# Patient Record
Sex: MALE | Race: WHITE | NOT HISPANIC OR LATINO | Employment: OTHER | URBAN - METROPOLITAN AREA
[De-identification: names, ages, dates, MRNs, and addresses within clinical notes are randomized per-mention and may not be internally consistent; named-entity substitution may affect disease eponyms.]

---

## 2017-09-26 ENCOUNTER — APPOINTMENT (OUTPATIENT)
Dept: MRI IMAGING | Facility: HOSPITAL | Age: 72
End: 2017-09-26

## 2017-09-26 ENCOUNTER — HOSPITAL ENCOUNTER (INPATIENT)
Facility: HOSPITAL | Age: 72
LOS: 1 days | Discharge: ANOTHER HOSPITAL | End: 2017-09-26
Attending: EMERGENCY MEDICINE | Admitting: HOSPITALIST

## 2017-09-26 ENCOUNTER — HOSPITAL ENCOUNTER (INPATIENT)
Facility: HOSPITAL | Age: 72
LOS: 3 days | Discharge: HOME | End: 2017-09-29
Attending: HOSPITALIST | Admitting: HOSPITALIST

## 2017-09-26 ENCOUNTER — APPOINTMENT (OUTPATIENT)
Dept: CT IMAGING | Facility: HOSPITAL | Age: 72
End: 2017-09-26
Attending: EMERGENCY MEDICINE

## 2017-09-26 VITALS
HEIGHT: 68 IN | WEIGHT: 198.9 LBS | HEART RATE: 67 BPM | BODY MASS INDEX: 30.14 KG/M2 | SYSTOLIC BLOOD PRESSURE: 154 MMHG | OXYGEN SATURATION: 97 % | RESPIRATION RATE: 18 BRPM | TEMPERATURE: 97.6 F | DIASTOLIC BLOOD PRESSURE: 75 MMHG

## 2017-09-26 DIAGNOSIS — G45.3 AMAUROSIS FUGAX: Primary | ICD-10-CM

## 2017-09-26 DIAGNOSIS — H54.62 VISION LOSS, LEFT EYE: Primary | ICD-10-CM

## 2017-09-26 DIAGNOSIS — I65.22 STENOSIS OF LEFT CAROTID ARTERY: ICD-10-CM

## 2017-09-26 PROBLEM — I10 HYPERTENSION: Status: ACTIVE | Noted: 2017-09-26

## 2017-09-26 PROBLEM — I65.29 CAROTID STENOSIS, SYMPTOMATIC W/O INFARCT: Status: ACTIVE | Noted: 2017-09-26

## 2017-09-26 PROBLEM — M19.90 ARTHRITIS: Status: ACTIVE | Noted: 2017-09-26

## 2017-09-26 PROBLEM — J44.9 COPD (CHRONIC OBSTRUCTIVE PULMONARY DISEASE) (HCC): Status: ACTIVE | Noted: 2017-09-26

## 2017-09-26 PROBLEM — I25.10 CAD (CORONARY ARTERY DISEASE): Status: ACTIVE | Noted: 2017-09-26

## 2017-09-26 PROBLEM — E11.9 DIABETES MELLITUS (HCC): Status: ACTIVE | Noted: 2017-09-26

## 2017-09-26 LAB
ALBUMIN SERPL-MCNC: 4 G/DL (ref 3.5–5.2)
ALBUMIN/GLOB SERPL: 1.2 G/DL
ALP SERPL-CCNC: 65 U/L (ref 40–129)
ALT SERPL W P-5'-P-CCNC: 19 U/L (ref 5–41)
ANION GAP SERPL CALCULATED.3IONS-SCNC: 18.1 MMOL/L
AST SERPL-CCNC: 32 U/L (ref 5–40)
BASOPHILS # BLD AUTO: 0.07 10*3/MM3 (ref 0–0.2)
BASOPHILS NFR BLD AUTO: 1 % (ref 0–2)
BILIRUB SERPL-MCNC: 0.6 MG/DL (ref 0.2–1.2)
BUN BLD-MCNC: 15 MG/DL (ref 8–23)
BUN/CREAT SERPL: 13.5 (ref 7–25)
CALCIUM SPEC-SCNC: 9.4 MG/DL (ref 8.8–10.5)
CHLORIDE SERPL-SCNC: 100 MMOL/L (ref 98–107)
CHOLEST SERPL-MCNC: 155 MG/DL (ref 0–200)
CO2 SERPL-SCNC: 17.9 MMOL/L (ref 22–29)
CREAT BLD-MCNC: 1.11 MG/DL (ref 0.76–1.27)
DEPRECATED RDW RBC AUTO: 43.7 FL (ref 37–54)
EOSINOPHIL # BLD AUTO: 0.53 10*3/MM3 (ref 0.1–0.3)
EOSINOPHIL NFR BLD AUTO: 7.6 % (ref 0–4)
ERYTHROCYTE [DISTWIDTH] IN BLOOD BY AUTOMATED COUNT: 14 % (ref 11.5–14.5)
GFR SERPL CREATININE-BSD FRML MDRD: 65 ML/MIN/1.73
GLOBULIN UR ELPH-MCNC: 3.3 GM/DL
GLUCOSE BLD-MCNC: 144 MG/DL (ref 65–99)
GLUCOSE BLDC GLUCOMTR-MCNC: 103 MG/DL (ref 70–130)
GLUCOSE BLDC GLUCOMTR-MCNC: 105 MG/DL (ref 70–130)
GLUCOSE BLDC GLUCOMTR-MCNC: 126 MG/DL (ref 70–130)
HCT VFR BLD AUTO: 42.1 % (ref 42–52)
HDLC SERPL-MCNC: 39 MG/DL (ref 40–60)
HGB BLD-MCNC: 14.5 G/DL (ref 14–18)
IMM GRANULOCYTES # BLD: 0.03 10*3/MM3 (ref 0–0.03)
IMM GRANULOCYTES NFR BLD: 0.4 % (ref 0–0.5)
LDLC SERPL CALC-MCNC: 69 MG/DL (ref 0–100)
LDLC/HDLC SERPL: 1.77 {RATIO}
LYMPHOCYTES # BLD AUTO: 1.27 10*3/MM3 (ref 0.6–4.8)
LYMPHOCYTES NFR BLD AUTO: 18.1 % (ref 20–45)
MCH RBC QN AUTO: 29.6 PG (ref 27–31)
MCHC RBC AUTO-ENTMCNC: 34.4 G/DL (ref 31–37)
MCV RBC AUTO: 85.9 FL (ref 80–94)
MONOCYTES # BLD AUTO: 0.66 10*3/MM3 (ref 0–1)
MONOCYTES NFR BLD AUTO: 9.4 % (ref 3–8)
NEUTROPHILS # BLD AUTO: 4.45 10*3/MM3 (ref 1.5–8.3)
NEUTROPHILS NFR BLD AUTO: 63.5 % (ref 45–70)
NRBC BLD MANUAL-RTO: 0 /100 WBC (ref 0–0)
PLATELET # BLD AUTO: 242 10*3/MM3 (ref 140–500)
PMV BLD AUTO: 11 FL (ref 7.4–10.4)
POTASSIUM BLD-SCNC: 4.9 MMOL/L (ref 3.5–5.2)
PROT SERPL-MCNC: 7.3 G/DL (ref 6–8.5)
RBC # BLD AUTO: 4.9 10*6/MM3 (ref 4.7–6.1)
SODIUM BLD-SCNC: 136 MMOL/L (ref 136–145)
TRIGL SERPL-MCNC: 235 MG/DL (ref 0–150)
VLDLC SERPL-MCNC: 47 MG/DL (ref 8–32)
WBC NRBC COR # BLD: 7.01 10*3/MM3 (ref 4.8–10.8)

## 2017-09-26 PROCEDURE — 99285 EMERGENCY DEPT VISIT HI MDM: CPT | Performed by: EMERGENCY MEDICINE

## 2017-09-26 PROCEDURE — 70544 MR ANGIOGRAPHY HEAD W/O DYE: CPT

## 2017-09-26 PROCEDURE — 82962 GLUCOSE BLOOD TEST: CPT

## 2017-09-26 PROCEDURE — 99285 EMERGENCY DEPT VISIT HI MDM: CPT

## 2017-09-26 PROCEDURE — 70549 MR ANGIOGRAPH NECK W/O&W/DYE: CPT

## 2017-09-26 PROCEDURE — A9577 INJ MULTIHANCE: HCPCS | Performed by: EMERGENCY MEDICINE

## 2017-09-26 PROCEDURE — 93010 ELECTROCARDIOGRAM REPORT: CPT | Performed by: INTERNAL MEDICINE

## 2017-09-26 PROCEDURE — 80053 COMPREHEN METABOLIC PANEL: CPT | Performed by: EMERGENCY MEDICINE

## 2017-09-26 PROCEDURE — 85025 COMPLETE CBC W/AUTO DIFF WBC: CPT | Performed by: EMERGENCY MEDICINE

## 2017-09-26 PROCEDURE — 25510000001 GADOBENATE DIMEGLUMINE 529 MG/ML SOLUTION: Performed by: EMERGENCY MEDICINE

## 2017-09-26 PROCEDURE — 25010000002 ENOXAPARIN PER 10 MG: Performed by: PSYCHIATRY & NEUROLOGY

## 2017-09-26 PROCEDURE — 99235 HOSP IP/OBS SAME DATE MOD 70: CPT | Performed by: INTERNAL MEDICINE

## 2017-09-26 PROCEDURE — 93005 ELECTROCARDIOGRAM TRACING: CPT | Performed by: EMERGENCY MEDICINE

## 2017-09-26 PROCEDURE — 80061 LIPID PANEL: CPT | Performed by: PSYCHIATRY & NEUROLOGY

## 2017-09-26 PROCEDURE — 70551 MRI BRAIN STEM W/O DYE: CPT

## 2017-09-26 PROCEDURE — 70450 CT HEAD/BRAIN W/O DYE: CPT

## 2017-09-26 RX ORDER — SODIUM CHLORIDE 0.9 % (FLUSH) 0.9 %
10 SYRINGE (ML) INJECTION AS NEEDED
Status: DISCONTINUED | OUTPATIENT
Start: 2017-09-26 | End: 2017-09-29 | Stop reason: HOSPADM

## 2017-09-26 RX ORDER — MONTELUKAST SODIUM 10 MG/1
10 TABLET ORAL NIGHTLY
Status: DISCONTINUED | OUTPATIENT
Start: 2017-09-26 | End: 2017-09-26 | Stop reason: HOSPADM

## 2017-09-26 RX ORDER — DEXTROSE MONOHYDRATE 25 G/50ML
25 INJECTION, SOLUTION INTRAVENOUS
Status: DISCONTINUED | OUTPATIENT
Start: 2017-09-26 | End: 2017-09-29 | Stop reason: HOSPADM

## 2017-09-26 RX ORDER — ATORVASTATIN CALCIUM 40 MG/1
80 TABLET, FILM COATED ORAL NIGHTLY
Status: DISCONTINUED | OUTPATIENT
Start: 2017-09-26 | End: 2017-09-26 | Stop reason: HOSPADM

## 2017-09-26 RX ORDER — METOPROLOL TARTRATE 50 MG/1
50 TABLET, FILM COATED ORAL DAILY
Status: DISCONTINUED | OUTPATIENT
Start: 2017-09-27 | End: 2017-09-29 | Stop reason: HOSPADM

## 2017-09-26 RX ORDER — ASPIRIN 300 MG/1
300 SUPPOSITORY RECTAL DAILY
Status: DISCONTINUED | OUTPATIENT
Start: 2017-09-26 | End: 2017-09-26

## 2017-09-26 RX ORDER — SODIUM CHLORIDE 0.9 % (FLUSH) 0.9 %
10 SYRINGE (ML) INJECTION AS NEEDED
Status: CANCELLED | OUTPATIENT
Start: 2017-09-26

## 2017-09-26 RX ORDER — DEXTROSE MONOHYDRATE 25 G/50ML
25 INJECTION, SOLUTION INTRAVENOUS
Status: DISCONTINUED | OUTPATIENT
Start: 2017-09-26 | End: 2017-09-26 | Stop reason: HOSPADM

## 2017-09-26 RX ORDER — SODIUM CHLORIDE 9 MG/ML
40 INJECTION, SOLUTION INTRAVENOUS AS NEEDED
Status: DISCONTINUED | OUTPATIENT
Start: 2017-09-26 | End: 2017-09-29 | Stop reason: HOSPADM

## 2017-09-26 RX ORDER — CLOBETASOL PROPIONATE 0.5 MG/G
CREAM TOPICAL 2 TIMES DAILY
COMMUNITY
End: 2018-08-21 | Stop reason: ALTCHOICE

## 2017-09-26 RX ORDER — SODIUM CHLORIDE 0.9 % (FLUSH) 0.9 %
1-10 SYRINGE (ML) INJECTION AS NEEDED
Status: DISCONTINUED | OUTPATIENT
Start: 2017-09-26 | End: 2017-09-29 | Stop reason: HOSPADM

## 2017-09-26 RX ORDER — PANTOPRAZOLE SODIUM 40 MG/1
40 TABLET, DELAYED RELEASE ORAL 2 TIMES DAILY
Status: CANCELLED | OUTPATIENT
Start: 2017-09-26

## 2017-09-26 RX ORDER — GLIPIZIDE 5 MG/1
5 TABLET ORAL 2 TIMES DAILY
Status: CANCELLED | OUTPATIENT
Start: 2017-09-26

## 2017-09-26 RX ORDER — NICOTINE POLACRILEX 4 MG
15 LOZENGE BUCCAL
Status: DISCONTINUED | OUTPATIENT
Start: 2017-09-26 | End: 2017-09-26 | Stop reason: HOSPADM

## 2017-09-26 RX ORDER — NICOTINE POLACRILEX 4 MG
15 LOZENGE BUCCAL
Status: DISCONTINUED | OUTPATIENT
Start: 2017-09-26 | End: 2017-09-26 | Stop reason: SDUPTHER

## 2017-09-26 RX ORDER — GLIPIZIDE 5 MG/1
5 TABLET ORAL 2 TIMES DAILY
Status: DISCONTINUED | OUTPATIENT
Start: 2017-09-26 | End: 2017-09-26 | Stop reason: HOSPADM

## 2017-09-26 RX ORDER — CLOPIDOGREL BISULFATE 75 MG/1
75 TABLET ORAL DAILY
Status: DISCONTINUED | OUTPATIENT
Start: 2017-09-27 | End: 2017-09-26

## 2017-09-26 RX ORDER — ATORVASTATIN CALCIUM 80 MG/1
80 TABLET, FILM COATED ORAL NIGHTLY
Status: DISCONTINUED | OUTPATIENT
Start: 2017-09-26 | End: 2017-09-26

## 2017-09-26 RX ORDER — NEOMYCIN SULFATE, POLYMYXIN B SULFATE AND HYDROCORTISONE 10; 3.5; 1 MG/ML; MG/ML; [USP'U]/ML
2 SUSPENSION/ DROPS AURICULAR (OTIC) 4 TIMES DAILY
Status: ON HOLD | COMMUNITY
End: 2017-09-29

## 2017-09-26 RX ORDER — CLOPIDOGREL BISULFATE 75 MG/1
75 TABLET ORAL DAILY
Status: DISCONTINUED | OUTPATIENT
Start: 2017-09-26 | End: 2017-09-26 | Stop reason: HOSPADM

## 2017-09-26 RX ORDER — METOPROLOL TARTRATE 50 MG/1
50 TABLET, FILM COATED ORAL DAILY
Status: DISCONTINUED | OUTPATIENT
Start: 2017-09-26 | End: 2017-09-26 | Stop reason: HOSPADM

## 2017-09-26 RX ORDER — SODIUM CHLORIDE 0.9 % (FLUSH) 0.9 %
1-10 SYRINGE (ML) INJECTION AS NEEDED
Status: DISCONTINUED | OUTPATIENT
Start: 2017-09-26 | End: 2017-09-26 | Stop reason: HOSPADM

## 2017-09-26 RX ORDER — AMITRIPTYLINE HYDROCHLORIDE 10 MG/1
10 TABLET, FILM COATED ORAL NIGHTLY
Status: CANCELLED | OUTPATIENT
Start: 2017-09-26

## 2017-09-26 RX ORDER — PANTOPRAZOLE SODIUM 40 MG/1
40 TABLET, DELAYED RELEASE ORAL
Status: DISCONTINUED | OUTPATIENT
Start: 2017-09-27 | End: 2017-09-29 | Stop reason: HOSPADM

## 2017-09-26 RX ORDER — AMITRIPTYLINE HYDROCHLORIDE 10 MG/1
10 TABLET, FILM COATED ORAL NIGHTLY
Status: DISCONTINUED | OUTPATIENT
Start: 2017-09-26 | End: 2017-09-29 | Stop reason: HOSPADM

## 2017-09-26 RX ORDER — SODIUM CHLORIDE 9 MG/ML
40 INJECTION, SOLUTION INTRAVENOUS AS NEEDED
Status: CANCELLED | OUTPATIENT
Start: 2017-09-26

## 2017-09-26 RX ORDER — METOPROLOL TARTRATE 50 MG/1
50 TABLET, FILM COATED ORAL DAILY
COMMUNITY

## 2017-09-26 RX ORDER — AMITRIPTYLINE HYDROCHLORIDE 10 MG/1
10 TABLET, FILM COATED ORAL NIGHTLY
Status: DISCONTINUED | OUTPATIENT
Start: 2017-09-26 | End: 2017-09-26

## 2017-09-26 RX ORDER — MONTELUKAST SODIUM 10 MG/1
10 TABLET ORAL NIGHTLY
Status: CANCELLED | OUTPATIENT
Start: 2017-09-26

## 2017-09-26 RX ORDER — AMLODIPINE BESYLATE 5 MG/1
5 TABLET ORAL DAILY
Status: DISCONTINUED | OUTPATIENT
Start: 2017-09-26 | End: 2017-09-26 | Stop reason: HOSPADM

## 2017-09-26 RX ORDER — MONTELUKAST SODIUM 10 MG/1
10 TABLET ORAL NIGHTLY
Status: DISCONTINUED | OUTPATIENT
Start: 2017-09-26 | End: 2017-09-29 | Stop reason: HOSPADM

## 2017-09-26 RX ORDER — ASPIRIN 325 MG
325 TABLET ORAL DAILY
Status: DISCONTINUED | OUTPATIENT
Start: 2017-09-27 | End: 2017-09-27

## 2017-09-26 RX ORDER — SODIUM CHLORIDE 9 MG/ML
40 INJECTION, SOLUTION INTRAVENOUS AS NEEDED
Status: DISCONTINUED | OUTPATIENT
Start: 2017-09-26 | End: 2017-09-26 | Stop reason: HOSPADM

## 2017-09-26 RX ORDER — SODIUM CHLORIDE 0.9 % (FLUSH) 0.9 %
10 SYRINGE (ML) INJECTION AS NEEDED
Status: DISCONTINUED | OUTPATIENT
Start: 2017-09-26 | End: 2017-09-26 | Stop reason: HOSPADM

## 2017-09-26 RX ORDER — DEXTROSE MONOHYDRATE 25 G/50ML
25 INJECTION, SOLUTION INTRAVENOUS
Status: DISCONTINUED | OUTPATIENT
Start: 2017-09-26 | End: 2017-09-26 | Stop reason: SDUPTHER

## 2017-09-26 RX ORDER — ACETAMINOPHEN 325 MG/1
650 TABLET ORAL EVERY 6 HOURS PRN
Status: DISCONTINUED | OUTPATIENT
Start: 2017-09-26 | End: 2017-09-29 | Stop reason: HOSPADM

## 2017-09-26 RX ORDER — MONTELUKAST SODIUM 10 MG/1
10 TABLET ORAL NIGHTLY
COMMUNITY

## 2017-09-26 RX ORDER — AMITRIPTYLINE HYDROCHLORIDE 10 MG/1
10 TABLET, FILM COATED ORAL NIGHTLY
COMMUNITY
End: 2018-05-16

## 2017-09-26 RX ORDER — AMLODIPINE BESYLATE 5 MG/1
5 TABLET ORAL DAILY
COMMUNITY

## 2017-09-26 RX ORDER — SODIUM CHLORIDE 0.9 % (FLUSH) 0.9 %
1-10 SYRINGE (ML) INJECTION AS NEEDED
Status: CANCELLED | OUTPATIENT
Start: 2017-09-26

## 2017-09-26 RX ORDER — PANTOPRAZOLE SODIUM 40 MG/1
40 TABLET, DELAYED RELEASE ORAL 2 TIMES DAILY
Status: DISCONTINUED | OUTPATIENT
Start: 2017-09-26 | End: 2017-09-26 | Stop reason: HOSPADM

## 2017-09-26 RX ORDER — MONTELUKAST SODIUM 10 MG/1
10 TABLET ORAL NIGHTLY
Status: DISCONTINUED | OUTPATIENT
Start: 2017-09-26 | End: 2017-09-26

## 2017-09-26 RX ORDER — AMLODIPINE BESYLATE 5 MG/1
5 TABLET ORAL DAILY
Status: DISCONTINUED | OUTPATIENT
Start: 2017-09-27 | End: 2017-09-26

## 2017-09-26 RX ORDER — ONDANSETRON 2 MG/ML
4 INJECTION INTRAMUSCULAR; INTRAVENOUS EVERY 6 HOURS PRN
Status: DISCONTINUED | OUTPATIENT
Start: 2017-09-26 | End: 2017-09-29 | Stop reason: HOSPADM

## 2017-09-26 RX ORDER — PANTOPRAZOLE SODIUM 40 MG/1
40 TABLET, DELAYED RELEASE ORAL 2 TIMES DAILY
Status: DISCONTINUED | OUTPATIENT
Start: 2017-09-27 | End: 2017-09-26

## 2017-09-26 RX ORDER — METOPROLOL TARTRATE 50 MG/1
50 TABLET, FILM COATED ORAL DAILY
Status: DISCONTINUED | OUTPATIENT
Start: 2017-09-27 | End: 2017-09-26

## 2017-09-26 RX ORDER — CLOPIDOGREL BISULFATE 75 MG/1
75 TABLET ORAL DAILY
Status: ON HOLD | COMMUNITY
End: 2017-09-29

## 2017-09-26 RX ORDER — NICOTINE POLACRILEX 4 MG
15 LOZENGE BUCCAL
COMMUNITY
End: 2018-05-16 | Stop reason: ALTCHOICE

## 2017-09-26 RX ORDER — ATORVASTATIN CALCIUM 40 MG/1
80 TABLET, FILM COATED ORAL NIGHTLY
Status: CANCELLED | OUTPATIENT
Start: 2017-09-26

## 2017-09-26 RX ORDER — GLIPIZIDE 5 MG/1
5 TABLET ORAL 2 TIMES DAILY
Status: DISCONTINUED | OUTPATIENT
Start: 2017-09-26 | End: 2017-09-29

## 2017-09-26 RX ORDER — ASPIRIN 325 MG
325 TABLET ORAL DAILY
Status: DISCONTINUED | OUTPATIENT
Start: 2017-09-26 | End: 2017-09-26

## 2017-09-26 RX ORDER — METHOCARBAMOL 500 MG/1
500 TABLET, FILM COATED ORAL 3 TIMES DAILY
Status: ON HOLD | COMMUNITY
End: 2017-09-29

## 2017-09-26 RX ORDER — CLOBETASOL PROPIONATE 0.5 MG/G
1 CREAM TOPICAL 2 TIMES DAILY
Status: ON HOLD | COMMUNITY
End: 2017-09-29

## 2017-09-26 RX ORDER — METOPROLOL TARTRATE 50 MG/1
50 TABLET, FILM COATED ORAL DAILY
Status: CANCELLED | OUTPATIENT
Start: 2017-09-26

## 2017-09-26 RX ORDER — SIMVASTATIN 20 MG
20 TABLET ORAL DAILY
Status: ON HOLD | COMMUNITY
End: 2017-09-29

## 2017-09-26 RX ORDER — ASPIRIN 81 MG/1
324 TABLET, CHEWABLE ORAL ONCE
Status: COMPLETED | OUTPATIENT
Start: 2017-09-26 | End: 2017-09-26

## 2017-09-26 RX ORDER — AMLODIPINE BESYLATE 5 MG/1
5 TABLET ORAL DAILY
Status: CANCELLED | OUTPATIENT
Start: 2017-09-26

## 2017-09-26 RX ORDER — AMITRIPTYLINE HYDROCHLORIDE 10 MG/1
10 TABLET, FILM COATED ORAL NIGHTLY
Status: DISCONTINUED | OUTPATIENT
Start: 2017-09-26 | End: 2017-09-26 | Stop reason: HOSPADM

## 2017-09-26 RX ORDER — NICOTINE POLACRILEX 4 MG
15 LOZENGE BUCCAL
Status: DISCONTINUED | OUTPATIENT
Start: 2017-09-26 | End: 2017-09-29 | Stop reason: HOSPADM

## 2017-09-26 RX ORDER — ATORVASTATIN CALCIUM 80 MG/1
80 TABLET, FILM COATED ORAL NIGHTLY
Status: ON HOLD | COMMUNITY
End: 2017-09-29

## 2017-09-26 RX ORDER — GLIPIZIDE 5 MG/1
5 TABLET ORAL 2 TIMES DAILY
Status: DISCONTINUED | OUTPATIENT
Start: 2017-09-27 | End: 2017-09-26

## 2017-09-26 RX ORDER — DEXTROSE MONOHYDRATE 25 G/50ML
25 INJECTION, SOLUTION INTRAVENOUS
Status: CANCELLED | OUTPATIENT
Start: 2017-09-26

## 2017-09-26 RX ORDER — ATORVASTATIN CALCIUM 80 MG/1
80 TABLET, FILM COATED ORAL NIGHTLY
Status: DISCONTINUED | OUTPATIENT
Start: 2017-09-26 | End: 2017-09-29 | Stop reason: HOSPADM

## 2017-09-26 RX ORDER — ATORVASTATIN CALCIUM 10 MG/1
10 TABLET, FILM COATED ORAL DAILY
Status: DISCONTINUED | OUTPATIENT
Start: 2017-09-26 | End: 2017-09-26

## 2017-09-26 RX ORDER — PIROXICAM 10 MG/1
10 CAPSULE ORAL
COMMUNITY
End: 2017-09-29 | Stop reason: HOSPADM

## 2017-09-26 RX ORDER — CLOPIDOGREL BISULFATE 75 MG/1
75 TABLET ORAL DAILY
Status: CANCELLED | OUTPATIENT
Start: 2017-09-27

## 2017-09-26 RX ORDER — NICOTINE POLACRILEX 4 MG
15 LOZENGE BUCCAL
Status: CANCELLED | OUTPATIENT
Start: 2017-09-26

## 2017-09-26 RX ADMIN — ENOXAPARIN SODIUM 40 MG: 40 INJECTION SUBCUTANEOUS at 12:54

## 2017-09-26 RX ADMIN — GLIPIZIDE 5 MG: 5 TABLET ORAL at 22:14

## 2017-09-26 RX ADMIN — ASPIRIN 81 MG 324 MG: 81 TABLET ORAL at 11:08

## 2017-09-26 RX ADMIN — ATORVASTATIN CALCIUM 80 MG: 80 TABLET, FILM COATED ORAL at 22:14

## 2017-09-26 RX ADMIN — CLOPIDOGREL BISULFATE 75 MG: 75 TABLET ORAL at 13:05

## 2017-09-26 RX ADMIN — GADOBENATE DIMEGLUMINE 18 ML: 529 INJECTION, SOLUTION INTRAVENOUS at 14:47

## 2017-09-26 RX ADMIN — METFORMIN HYDROCHLORIDE 1000 MG: 1000 TABLET ORAL at 22:13

## 2017-09-26 RX ADMIN — AMITRIPTYLINE HYDROCHLORIDE 10 MG: 10 TABLET, FILM COATED ORAL at 22:14

## 2017-09-26 RX ADMIN — MONTELUKAST 10 MG: 10 TABLET, FILM COATED ORAL at 22:14

## 2017-09-27 ENCOUNTER — ANESTHESIA EVENT (OUTPATIENT)
Dept: PERIOP | Facility: HOSPITAL | Age: 72
End: 2017-09-27

## 2017-09-27 ENCOUNTER — ANESTHESIA (OUTPATIENT)
Dept: PERIOP | Facility: HOSPITAL | Age: 72
End: 2017-09-27

## 2017-09-27 ENCOUNTER — APPOINTMENT (OUTPATIENT)
Dept: NUCLEAR MEDICINE | Facility: HOSPITAL | Age: 72
End: 2017-09-27
Attending: SURGERY

## 2017-09-27 ENCOUNTER — APPOINTMENT (OUTPATIENT)
Dept: CARDIOLOGY | Facility: HOSPITAL | Age: 72
End: 2017-09-27
Attending: SURGERY

## 2017-09-27 LAB
ARTICHOKE IGE QN: 80 MG/DL (ref 0–100)
BH CV STRESS BP STAGE 1: NORMAL
BH CV STRESS BP STAGE 2: NORMAL
BH CV STRESS DURATION MIN STAGE 1: 3
BH CV STRESS DURATION MIN STAGE 2: 2
BH CV STRESS DURATION SEC STAGE 1: 0
BH CV STRESS DURATION SEC STAGE 2: 3
BH CV STRESS GRADE STAGE 1: 10
BH CV STRESS GRADE STAGE 2: 12
BH CV STRESS HR STAGE 1: 137
BH CV STRESS HR STAGE 2: 152
BH CV STRESS METS STAGE 1: 5
BH CV STRESS METS STAGE 2: 7.5
BH CV STRESS PROTOCOL 1: NORMAL
BH CV STRESS RECOVERY BP: NORMAL MMHG
BH CV STRESS RECOVERY HR: 110 BPM
BH CV STRESS SPEED STAGE 1: 1.7
BH CV STRESS SPEED STAGE 2: 2.5
BH CV STRESS STAGE 1: 1
BH CV STRESS STAGE 2: 2
BH CV XLRA MEAS LEFT BULB PSV: 415 CM/SEC
BH CV XLRA MEAS LEFT DIST CCA EDV: -11.7 CM/SEC
BH CV XLRA MEAS LEFT DIST CCA PSV: -85 CM/SEC
BH CV XLRA MEAS LEFT DIST ICA EDV: -12 CM/SEC
BH CV XLRA MEAS LEFT DIST ICA PSV: -29.4 CM/SEC
BH CV XLRA MEAS LEFT ICA/CCA RATIO: 5
BH CV XLRA MEAS LEFT MID ICA EDV: -14 CM/SEC
BH CV XLRA MEAS LEFT MID ICA PSV: -27.9 CM/SEC
BH CV XLRA MEAS LEFT PROX CCA EDV: 10.6 CM/SEC
BH CV XLRA MEAS LEFT PROX CCA PSV: 80.3 CM/SEC
BH CV XLRA MEAS LEFT PROX ECA EDV: -10.2 CM/SEC
BH CV XLRA MEAS LEFT PROX ECA PSV: -123 CM/SEC
BH CV XLRA MEAS LEFT PROX ICA EDV: 170 CM/SEC
BH CV XLRA MEAS LEFT PROX ICA PSV: 423 CM/SEC
BH CV XLRA MEAS LEFT PROX SCLA PSV: 151 CM/SEC
BH CV XLRA MEAS LEFT VERTEBRAL A EDV: 17 CM/SEC
BH CV XLRA MEAS LEFT VERTEBRAL A PSV: 55.1 CM/SEC
BH CV XLRA MEAS RIGHT DIST CCA EDV: 14.9 CM/SEC
BH CV XLRA MEAS RIGHT DIST CCA PSV: 91.1 CM/SEC
BH CV XLRA MEAS RIGHT DIST ICA EDV: -21.2 CM/SEC
BH CV XLRA MEAS RIGHT DIST ICA PSV: -76.2 CM/SEC
BH CV XLRA MEAS RIGHT ICA/CCA RATIO: 0.84
BH CV XLRA MEAS RIGHT MID ICA EDV: -19.6 CM/SEC
BH CV XLRA MEAS RIGHT MID ICA PSV: -65.2 CM/SEC
BH CV XLRA MEAS RIGHT PROX CCA EDV: 21.2 CM/SEC
BH CV XLRA MEAS RIGHT PROX CCA PSV: 97.4 CM/SEC
BH CV XLRA MEAS RIGHT PROX ECA EDV: -9.4 CM/SEC
BH CV XLRA MEAS RIGHT PROX ECA PSV: -102 CM/SEC
BH CV XLRA MEAS RIGHT PROX ICA EDV: -18.9 CM/SEC
BH CV XLRA MEAS RIGHT PROX ICA PSV: -66.8 CM/SEC
BH CV XLRA MEAS RIGHT PROX SCLA PSV: 87.4 CM/SEC
BH CV XLRA MEAS RIGHT VERTEBRAL A EDV: -13.4 CM/SEC
BH CV XLRA MEAS RIGHT VERTEBRAL A PSV: -59.7 CM/SEC
CHOLEST SERPL-MCNC: 158 MG/DL (ref 0–200)
GLUCOSE BLDC GLUCOMTR-MCNC: 118 MG/DL (ref 70–130)
GLUCOSE BLDC GLUCOMTR-MCNC: 135 MG/DL (ref 70–130)
GLUCOSE BLDC GLUCOMTR-MCNC: 147 MG/DL (ref 70–130)
GLUCOSE BLDC GLUCOMTR-MCNC: 193 MG/DL (ref 70–130)
HBA1C MFR BLD: 8.04 % (ref 4.8–5.6)
HDLC SERPL-MCNC: 32 MG/DL (ref 40–60)
LDLC SERPL CALC-MCNC: ABNORMAL MG/DL (ref 0–100)
LDLC/HDLC SERPL: ABNORMAL {RATIO}
LEFT ARM BP: NORMAL MMHG
LV EF NUC BP: 64 %
MAXIMAL PREDICTED HEART RATE: 148 BPM
PERCENT MAX PREDICTED HR: 102.7 %
RIGHT ARM BP: NORMAL MMHG
STRESS BASELINE BP: NORMAL MMHG
STRESS BASELINE HR: 96 BPM
STRESS PERCENT HR: 121 %
STRESS POST ESTIMATED WORKLOAD: 6.4 METS
STRESS POST EXERCISE DUR MIN: 5 MIN
STRESS POST EXERCISE DUR SEC: 3 SEC
STRESS POST PEAK BP: NORMAL MMHG
STRESS POST PEAK HR: 152 BPM
STRESS TARGET HR: 126 BPM
TRIGL SERPL-MCNC: 491 MG/DL (ref 0–150)
VLDLC SERPL-MCNC: ABNORMAL MG/DL (ref 5–40)

## 2017-09-27 PROCEDURE — 83036 HEMOGLOBIN GLYCOSYLATED A1C: CPT | Performed by: HOSPITALIST

## 2017-09-27 PROCEDURE — 25010000002 FENTANYL CITRATE (PF) 100 MCG/2ML SOLUTION: Performed by: ANESTHESIOLOGY

## 2017-09-27 PROCEDURE — 82962 GLUCOSE BLOOD TEST: CPT

## 2017-09-27 PROCEDURE — 94799 UNLISTED PULMONARY SVC/PX: CPT

## 2017-09-27 PROCEDURE — 93880 EXTRACRANIAL BILAT STUDY: CPT

## 2017-09-27 PROCEDURE — 80061 LIPID PANEL: CPT | Performed by: HOSPITALIST

## 2017-09-27 PROCEDURE — 93017 CV STRESS TEST TRACING ONLY: CPT

## 2017-09-27 PROCEDURE — 78452 HT MUSCLE IMAGE SPECT MULT: CPT

## 2017-09-27 PROCEDURE — 85347 COAGULATION TIME ACTIVATED: CPT

## 2017-09-27 PROCEDURE — 83721 ASSAY OF BLOOD LIPOPROTEIN: CPT | Performed by: HOSPITALIST

## 2017-09-27 PROCEDURE — 25010000002 PROPOFOL 10 MG/ML EMULSION: Performed by: ANESTHESIOLOGY

## 2017-09-27 PROCEDURE — A9500 TC99M SESTAMIBI: HCPCS | Performed by: INTERNAL MEDICINE

## 2017-09-27 PROCEDURE — 63710000001 INSULIN ASPART PER 5 UNITS: Performed by: HOSPITALIST

## 2017-09-27 PROCEDURE — 0BH17EZ INSERTION OF ENDOTRACHEAL AIRWAY INTO TRACHEA, VIA NATURAL OR ARTIFICIAL OPENING: ICD-10-PCS | Performed by: ANESTHESIOLOGY

## 2017-09-27 PROCEDURE — 82947 ASSAY GLUCOSE BLOOD QUANT: CPT

## 2017-09-27 PROCEDURE — 85014 HEMATOCRIT: CPT

## 2017-09-27 PROCEDURE — 0 TECHNETIUM SESTAMIBI: Performed by: INTERNAL MEDICINE

## 2017-09-27 PROCEDURE — 82803 BLOOD GASES ANY COMBINATION: CPT

## 2017-09-27 PROCEDURE — 85018 HEMOGLOBIN: CPT

## 2017-09-27 PROCEDURE — 25010000003 CEFAZOLIN IN DEXTROSE 2-4 GM/100ML-% SOLUTION: Performed by: NEUROLOGICAL SURGERY

## 2017-09-27 PROCEDURE — 25010000002 PHENYLEPHRINE PER 1 ML: Performed by: ANESTHESIOLOGY

## 2017-09-27 PROCEDURE — 99223 1ST HOSP IP/OBS HIGH 75: CPT | Performed by: NURSE PRACTITIONER

## 2017-09-27 PROCEDURE — 94640 AIRWAY INHALATION TREATMENT: CPT

## 2017-09-27 PROCEDURE — 25010000002 MIDAZOLAM PER 1 MG: Performed by: ANESTHESIOLOGY

## 2017-09-27 RX ORDER — IPRATROPIUM BROMIDE AND ALBUTEROL SULFATE 2.5; .5 MG/3ML; MG/3ML
3 SOLUTION RESPIRATORY (INHALATION) ONCE
Status: COMPLETED | OUTPATIENT
Start: 2017-09-27 | End: 2017-09-27

## 2017-09-27 RX ORDER — PROMETHAZINE HYDROCHLORIDE 25 MG/1
12.5 TABLET ORAL ONCE AS NEEDED
Status: DISCONTINUED | OUTPATIENT
Start: 2017-09-27 | End: 2017-09-27 | Stop reason: HOSPADM

## 2017-09-27 RX ORDER — LIDOCAINE HYDROCHLORIDE 40 MG/ML
SOLUTION TOPICAL AS NEEDED
Status: DISCONTINUED | OUTPATIENT
Start: 2017-09-27 | End: 2017-09-27 | Stop reason: SURG

## 2017-09-27 RX ORDER — FENTANYL CITRATE 50 UG/ML
INJECTION, SOLUTION INTRAMUSCULAR; INTRAVENOUS AS NEEDED
Status: DISCONTINUED | OUTPATIENT
Start: 2017-09-27 | End: 2017-09-27 | Stop reason: SURG

## 2017-09-27 RX ORDER — ROCURONIUM BROMIDE 10 MG/ML
INJECTION, SOLUTION INTRAVENOUS AS NEEDED
Status: DISCONTINUED | OUTPATIENT
Start: 2017-09-27 | End: 2017-09-27 | Stop reason: SURG

## 2017-09-27 RX ORDER — PROMETHAZINE HYDROCHLORIDE 25 MG/1
25 SUPPOSITORY RECTAL ONCE AS NEEDED
Status: DISCONTINUED | OUTPATIENT
Start: 2017-09-27 | End: 2017-09-27 | Stop reason: HOSPADM

## 2017-09-27 RX ORDER — HYDRALAZINE HYDROCHLORIDE 20 MG/ML
5 INJECTION INTRAMUSCULAR; INTRAVENOUS
Status: DISCONTINUED | OUTPATIENT
Start: 2017-09-27 | End: 2017-09-27 | Stop reason: HOSPADM

## 2017-09-27 RX ORDER — FENTANYL CITRATE 50 UG/ML
50 INJECTION, SOLUTION INTRAMUSCULAR; INTRAVENOUS
Status: DISCONTINUED | OUTPATIENT
Start: 2017-09-27 | End: 2017-09-27 | Stop reason: HOSPADM

## 2017-09-27 RX ORDER — PROPOFOL 10 MG/ML
VIAL (ML) INTRAVENOUS AS NEEDED
Status: DISCONTINUED | OUTPATIENT
Start: 2017-09-27 | End: 2017-09-27 | Stop reason: SURG

## 2017-09-27 RX ORDER — CEFAZOLIN SODIUM 2 G/100ML
2 INJECTION, SOLUTION INTRAVENOUS ONCE
Status: COMPLETED | OUTPATIENT
Start: 2017-09-27 | End: 2017-09-27

## 2017-09-27 RX ORDER — LIDOCAINE HYDROCHLORIDE 20 MG/ML
INJECTION, SOLUTION INFILTRATION; PERINEURAL AS NEEDED
Status: DISCONTINUED | OUTPATIENT
Start: 2017-09-27 | End: 2017-09-27 | Stop reason: SURG

## 2017-09-27 RX ORDER — PROMETHAZINE HYDROCHLORIDE 25 MG/ML
12.5 INJECTION, SOLUTION INTRAMUSCULAR; INTRAVENOUS ONCE AS NEEDED
Status: DISCONTINUED | OUTPATIENT
Start: 2017-09-27 | End: 2017-09-27 | Stop reason: HOSPADM

## 2017-09-27 RX ORDER — OXYCODONE AND ACETAMINOPHEN 7.5; 325 MG/1; MG/1
1 TABLET ORAL ONCE AS NEEDED
Status: DISCONTINUED | OUTPATIENT
Start: 2017-09-27 | End: 2017-09-27 | Stop reason: HOSPADM

## 2017-09-27 RX ORDER — CLOPIDOGREL BISULFATE 75 MG/1
75 TABLET ORAL DAILY
Status: DISCONTINUED | OUTPATIENT
Start: 2017-09-28 | End: 2017-09-27

## 2017-09-27 RX ORDER — PROMETHAZINE HYDROCHLORIDE 25 MG/1
25 TABLET ORAL ONCE AS NEEDED
Status: DISCONTINUED | OUTPATIENT
Start: 2017-09-27 | End: 2017-09-27 | Stop reason: HOSPADM

## 2017-09-27 RX ORDER — HYDROMORPHONE HYDROCHLORIDE 1 MG/ML
0.5 INJECTION, SOLUTION INTRAMUSCULAR; INTRAVENOUS; SUBCUTANEOUS
Status: DISCONTINUED | OUTPATIENT
Start: 2017-09-27 | End: 2017-09-27 | Stop reason: HOSPADM

## 2017-09-27 RX ORDER — NITROGLYCERIN 5 MG/ML
INJECTION, SOLUTION INTRAVENOUS AS NEEDED
Status: DISCONTINUED | OUTPATIENT
Start: 2017-09-27 | End: 2017-09-27 | Stop reason: SURG

## 2017-09-27 RX ORDER — LABETALOL HYDROCHLORIDE 5 MG/ML
5 INJECTION, SOLUTION INTRAVENOUS
Status: DISCONTINUED | OUTPATIENT
Start: 2017-09-27 | End: 2017-09-27 | Stop reason: HOSPADM

## 2017-09-27 RX ORDER — SODIUM CHLORIDE, SODIUM LACTATE, POTASSIUM CHLORIDE, CALCIUM CHLORIDE 600; 310; 30; 20 MG/100ML; MG/100ML; MG/100ML; MG/100ML
9 INJECTION, SOLUTION INTRAVENOUS CONTINUOUS
Status: DISCONTINUED | OUTPATIENT
Start: 2017-09-27 | End: 2017-09-29 | Stop reason: HOSPADM

## 2017-09-27 RX ORDER — CLOPIDOGREL BISULFATE 75 MG/1
150 TABLET ORAL ONCE
Status: DISCONTINUED | OUTPATIENT
Start: 2017-09-27 | End: 2017-09-27

## 2017-09-27 RX ORDER — MIDAZOLAM HYDROCHLORIDE 1 MG/ML
2 INJECTION INTRAMUSCULAR; INTRAVENOUS
Status: DISCONTINUED | OUTPATIENT
Start: 2017-09-27 | End: 2017-09-27 | Stop reason: HOSPADM

## 2017-09-27 RX ORDER — FLUMAZENIL 0.1 MG/ML
0.2 INJECTION INTRAVENOUS AS NEEDED
Status: DISCONTINUED | OUTPATIENT
Start: 2017-09-27 | End: 2017-09-27 | Stop reason: HOSPADM

## 2017-09-27 RX ORDER — FAMOTIDINE 10 MG/ML
20 INJECTION, SOLUTION INTRAVENOUS ONCE
Status: COMPLETED | OUTPATIENT
Start: 2017-09-27 | End: 2017-09-27

## 2017-09-27 RX ORDER — DIPHENHYDRAMINE HYDROCHLORIDE 50 MG/ML
12.5 INJECTION INTRAMUSCULAR; INTRAVENOUS
Status: DISCONTINUED | OUTPATIENT
Start: 2017-09-27 | End: 2017-09-27 | Stop reason: HOSPADM

## 2017-09-27 RX ORDER — SODIUM CHLORIDE 0.9 % (FLUSH) 0.9 %
1-10 SYRINGE (ML) INJECTION AS NEEDED
Status: DISCONTINUED | OUTPATIENT
Start: 2017-09-27 | End: 2017-09-27 | Stop reason: HOSPADM

## 2017-09-27 RX ORDER — SODIUM CHLORIDE, SODIUM LACTATE, POTASSIUM CHLORIDE, CALCIUM CHLORIDE 600; 310; 30; 20 MG/100ML; MG/100ML; MG/100ML; MG/100ML
100 INJECTION, SOLUTION INTRAVENOUS CONTINUOUS
Status: DISCONTINUED | OUTPATIENT
Start: 2017-09-27 | End: 2017-09-29 | Stop reason: HOSPADM

## 2017-09-27 RX ORDER — HYDROCODONE BITARTRATE AND ACETAMINOPHEN 7.5; 325 MG/1; MG/1
1 TABLET ORAL ONCE AS NEEDED
Status: DISCONTINUED | OUTPATIENT
Start: 2017-09-27 | End: 2017-09-27 | Stop reason: HOSPADM

## 2017-09-27 RX ORDER — MIDAZOLAM HYDROCHLORIDE 1 MG/ML
1 INJECTION INTRAMUSCULAR; INTRAVENOUS
Status: DISCONTINUED | OUTPATIENT
Start: 2017-09-27 | End: 2017-09-27 | Stop reason: HOSPADM

## 2017-09-27 RX ORDER — EPHEDRINE SULFATE 50 MG/ML
5 INJECTION, SOLUTION INTRAVENOUS ONCE AS NEEDED
Status: DISCONTINUED | OUTPATIENT
Start: 2017-09-27 | End: 2017-09-27 | Stop reason: HOSPADM

## 2017-09-27 RX ORDER — NALOXONE HCL 0.4 MG/ML
0.2 VIAL (ML) INJECTION AS NEEDED
Status: DISCONTINUED | OUTPATIENT
Start: 2017-09-27 | End: 2017-09-27 | Stop reason: HOSPADM

## 2017-09-27 RX ORDER — ONDANSETRON 2 MG/ML
4 INJECTION INTRAMUSCULAR; INTRAVENOUS ONCE AS NEEDED
Status: DISCONTINUED | OUTPATIENT
Start: 2017-09-27 | End: 2017-09-27 | Stop reason: HOSPADM

## 2017-09-27 RX ADMIN — CEFAZOLIN SODIUM 2 G: 2 INJECTION, SOLUTION INTRAVENOUS at 17:46

## 2017-09-27 RX ADMIN — PHENYLEPHRINE HYDROCHLORIDE 100 MCG: 10 INJECTION INTRAVENOUS at 17:38

## 2017-09-27 RX ADMIN — SUGAMMADEX 500 MG: 100 INJECTION, SOLUTION INTRAVENOUS at 18:16

## 2017-09-27 RX ADMIN — FENTANYL CITRATE 50 MCG: 50 INJECTION INTRAMUSCULAR; INTRAVENOUS at 17:37

## 2017-09-27 RX ADMIN — PROPOFOL 125 MG: 10 INJECTION, EMULSION INTRAVENOUS at 17:37

## 2017-09-27 RX ADMIN — AMITRIPTYLINE HYDROCHLORIDE 10 MG: 10 TABLET, FILM COATED ORAL at 21:07

## 2017-09-27 RX ADMIN — IPRATROPIUM BROMIDE AND ALBUTEROL SULFATE 3 ML: .5; 3 SOLUTION RESPIRATORY (INHALATION) at 17:10

## 2017-09-27 RX ADMIN — LIDOCAINE HYDROCHLORIDE 80 MG: 20 INJECTION, SOLUTION INFILTRATION; PERINEURAL at 17:37

## 2017-09-27 RX ADMIN — MIDAZOLAM 1 MG: 1 INJECTION INTRAMUSCULAR; INTRAVENOUS at 16:48

## 2017-09-27 RX ADMIN — TECHNETIUM TC-99M SESTAMIBI 1 DOSE: 1 INJECTION INTRAVENOUS at 12:05

## 2017-09-27 RX ADMIN — INSULIN ASPART 2 UNITS: 100 INJECTION, SOLUTION INTRAVENOUS; SUBCUTANEOUS at 21:18

## 2017-09-27 RX ADMIN — FENTANYL CITRATE 50 MCG: 50 INJECTION INTRAMUSCULAR; INTRAVENOUS at 16:52

## 2017-09-27 RX ADMIN — SODIUM CHLORIDE, POTASSIUM CHLORIDE, SODIUM LACTATE AND CALCIUM CHLORIDE 9 ML/HR: 600; 310; 30; 20 INJECTION, SOLUTION INTRAVENOUS at 16:44

## 2017-09-27 RX ADMIN — MONTELUKAST 10 MG: 10 TABLET, FILM COATED ORAL at 21:07

## 2017-09-27 RX ADMIN — PANTOPRAZOLE SODIUM 40 MG: 40 TABLET, DELAYED RELEASE ORAL at 05:04

## 2017-09-27 RX ADMIN — ATORVASTATIN CALCIUM 80 MG: 80 TABLET, FILM COATED ORAL at 21:07

## 2017-09-27 RX ADMIN — TECHNETIUM TC-99M SESTAMIBI 1 DOSE: 1 INJECTION INTRAVENOUS at 13:45

## 2017-09-27 RX ADMIN — LIDOCAINE HYDROCHLORIDE 1 EACH: 40 SPRAY LARYNGEAL; TRANSTRACHEAL at 17:39

## 2017-09-27 RX ADMIN — FENTANYL CITRATE 50 MCG: 50 INJECTION INTRAMUSCULAR; INTRAVENOUS at 18:18

## 2017-09-27 RX ADMIN — PHENYLEPHRINE HYDROCHLORIDE 0.5 MCG/KG/MIN: 10 INJECTION, SOLUTION INTRAMUSCULAR; INTRAVENOUS; SUBCUTANEOUS at 17:46

## 2017-09-27 RX ADMIN — NITROGLYCERIN 100 MCG: 5 INJECTION, SOLUTION INTRAVENOUS at 18:21

## 2017-09-27 RX ADMIN — ROCURONIUM BROMIDE 50 MG: 10 INJECTION INTRAVENOUS at 17:37

## 2017-09-27 RX ADMIN — NITROGLYCERIN 100 MCG: 5 INJECTION, SOLUTION INTRAVENOUS at 18:18

## 2017-09-27 RX ADMIN — PHENYLEPHRINE HYDROCHLORIDE 100 MCG: 10 INJECTION INTRAVENOUS at 17:44

## 2017-09-27 RX ADMIN — FAMOTIDINE 20 MG: 10 INJECTION, SOLUTION INTRAVENOUS at 16:44

## 2017-09-27 NOTE — ANESTHESIA PROCEDURE NOTES
Rt Radial Arterial Line    Patient location during procedure: holding area   Line placed for hemodynamic monitoring and ABGs/Labs/ISTAT.  Performed By   Anesthesiologist: ANNY CARR  Preanesthetic Checklist  Completed: patient identified, site marked, surgical consent, pre-op evaluation, timeout performed, IV checked, risks and benefits discussed and monitors and equipment checked  Arterial Line Prep   Sterile Tech: gloves, mask, cap and sterile barriers  Prep: ChloraPrep  Patient monitoring: blood pressure monitoring, continuous pulse oximetry and EKG  Arterial Line Procedure   Laterality:right  Location:  radial artery  Catheter size: 20 G   Guidance: ultrasound guided  PROCEDURE NOTE/ULTRASOUND INTERPRETATION.  Using ultrasound guidance the potential vascular sites for insertion of the catheter were visualized to determine the patency of the vessel to be used for vascular access.  After selecting the appropriate site for insertion, the needle was visualized under ultrasound being inserted into the radial artery, followed by ultrasound confirmation of wire and catheter placement. There were no abnormalities seen on ultrasound; an image was taken; and the patient tolerated the procedure with no complications.   Number of attempts: 1  Successful placement: yes          Post Assessment   Dressing Type: occlusive dressing applied, secured with tape and wrist guard applied.   Complications no  Circ/Move/Sens Assessment: normal and unchanged.   Patient Tolerance: patient tolerated the procedure well with no apparent complications

## 2017-09-27 NOTE — ANESTHESIA PREPROCEDURE EVALUATION
Anesthesia Evaluation     Patient summary reviewed and Nursing notes reviewed   no history of anesthetic complications:  NPO Solid Status: Waived due to emergency  NPO Liquid Status: Waived due to emergency     Airway   Mallampati: II  TM distance: >3 FB  Neck ROM: full  no difficulty expected  Dental - normal exam   (+) edentulous    Pulmonary     breath sounds clear to auscultation  (+) a smoker Former, COPD severe, asthma, wheezes,   (-) shortness of breath, sleep apnea, decreased breath sounds  Cardiovascular - normal exam  Exercise tolerance: good (4-7 METS)    ECG reviewed  PT is on anticoagulation therapy  Patient on routine beta blocker and Beta blocker given within 24 hours of surgery  Rhythm: regular  Rate: normal    (+) hypertension poorly controlled, CAD, cardiac stents unknown timeframe PVD, hyperlipidemia  (-) past MI, angina, CHF, orthopnea, PND, COTE    ROS comment: Recent normal stress test, however his Jerad's Revised Cardiac Risk Index places him at over 11% risk for a major cardiac event    Neuro/Psych  (+) TIA (recent Amarousis Fugax to left eye),    (-) seizures, neuromuscular disease, CVA, dizziness/light headedness, weakness, numbness    ROS Comment: Carotid disease:  1. Critical stenosis of the proximal left internal carotid artery  estimated at greater than 90%.  2. Mild atherosclerotic disease of the right internal carotid artery,  however no evidence of a significant stenosis.  GI/Hepatic/Renal/Endo    (+)  diabetes mellitus using insulin,   (-) liver disease    Musculoskeletal     Abdominal  - normal exam   Substance History - negative use  (-) alcohol use, drug use     OB/GYN negative ob/gyn ROS         Other   (+) arthritis                                   Anesthesia Plan    ASA 4 - emergent     general   (A-line)  intravenous induction   Anesthetic plan and risks discussed with patient.  Use of blood products discussed with patient  Consented to blood products.

## 2017-09-27 NOTE — ANESTHESIA PROCEDURE NOTES
Airway  Urgency: elective    Airway not difficult    General Information and Staff    Patient location during procedure: OR  Anesthesiologist: ANNY CARR    Indications and Patient Condition  Indications for airway management: airway protection    Preoxygenated: yes  Mask difficulty assessment: 1 - vent by mask    Final Airway Details  Final airway type: endotracheal airway      Successful airway: ETT  Cuffed: yes   Successful intubation technique: direct laryngoscopy  Endotracheal tube insertion site: oral  Blade: Nahomy  Blade size: #4  ETT size: 8.0 mm  Cormack-Lehane Classification: grade I - full view of glottis  Placement verified by: chest auscultation and capnometry   Measured from: gums  ETT to gums (cm): 21  Number of attempts at approach: 1

## 2017-09-27 NOTE — ANESTHESIA POSTPROCEDURE EVALUATION
"Patient: Ted Glover    Procedure Summary     Date Anesthesia Start Anesthesia Stop Room / Location    09/27/17 1729 1827  [facility] / [facility]       Procedure Diagnosis Surgeon Provider    left carotid endarterectomy (Left Neck) Amaurosis fugax; Stenosis of left carotid artery  (Amaurosis fugax [G45.3]; Stenosis of left carotid artery [I65.22]) MD iZon Walden MD          Anesthesia Type: general  Last vitals  BP   126/78 (09/27/17 1905)    Temp   36.5 °C (97.7 °F) (09/27/17 1828)    Pulse   80 (09/27/17 1905)   Resp   14 (09/27/17 1905)    SpO2   93 % (09/27/17 1905)      Post Anesthesia Care and Evaluation    Patient location during evaluation: PACU  Patient participation: complete - patient participated  Level of consciousness: sleepy but conscious  Pain score: 0  Pain management: adequate  Airway patency: patent  Anesthetic complications: No anesthetic complications    Cardiovascular status: acceptable  Respiratory status: acceptable  Hydration status: acceptable    Comments: /78  Pulse 80  Temp 36.5 °C (97.7 °F) (Oral)   Resp 14  Ht 68\" (172.7 cm)  Wt 200 lb 6.4 oz (90.9 kg)  SpO2 93%  BMI 30.47 kg/m2        "

## 2017-09-28 ENCOUNTER — ANESTHESIA EVENT (OUTPATIENT)
Dept: PERIOP | Facility: HOSPITAL | Age: 72
End: 2017-09-28

## 2017-09-28 ENCOUNTER — ANESTHESIA (OUTPATIENT)
Dept: PERIOP | Facility: HOSPITAL | Age: 72
End: 2017-09-28

## 2017-09-28 ENCOUNTER — APPOINTMENT (OUTPATIENT)
Dept: CARDIOLOGY | Facility: HOSPITAL | Age: 72
End: 2017-09-28
Attending: NEUROLOGICAL SURGERY

## 2017-09-28 LAB
ACT BLD: 125 SECONDS (ref 82–152)
ANION GAP SERPL CALCULATED.3IONS-SCNC: 14.9 MMOL/L
BASE EXCESS BLDA CALC-SCNC: -1 MMOL/L (ref -5–5)
BASOPHILS # BLD AUTO: 0.07 10*3/MM3 (ref 0–0.2)
BASOPHILS NFR BLD AUTO: 1.1 % (ref 0–1.5)
BUN BLD-MCNC: 14 MG/DL (ref 8–23)
BUN/CREAT SERPL: 12.7 (ref 7–25)
CALCIUM SPEC-SCNC: 9.1 MG/DL (ref 8.6–10.5)
CHLORIDE SERPL-SCNC: 104 MMOL/L (ref 98–107)
CO2 BLDA-SCNC: 27 MMOL/L (ref 24–29)
CO2 SERPL-SCNC: 22.1 MMOL/L (ref 22–29)
CREAT BLD-MCNC: 1.1 MG/DL (ref 0.76–1.27)
DEPRECATED RDW RBC AUTO: 47 FL (ref 37–54)
EOSINOPHIL # BLD AUTO: 0.46 10*3/MM3 (ref 0–0.7)
EOSINOPHIL NFR BLD AUTO: 7.2 % (ref 0.3–6.2)
ERYTHROCYTE [DISTWIDTH] IN BLOOD BY AUTOMATED COUNT: 14.3 % (ref 11.5–14.5)
GFR SERPL CREATININE-BSD FRML MDRD: 66 ML/MIN/1.73
GLUCOSE BLD-MCNC: 180 MG/DL (ref 65–99)
GLUCOSE BLDC GLUCOMTR-MCNC: 141 MG/DL (ref 70–130)
GLUCOSE BLDC GLUCOMTR-MCNC: 157 MG/DL (ref 70–130)
GLUCOSE BLDC GLUCOMTR-MCNC: 203 MG/DL (ref 70–130)
HCO3 BLDA-SCNC: 25.3 MMOL/L (ref 22–26)
HCT VFR BLD AUTO: 41.3 % (ref 40.4–52.2)
HCT VFR BLDA CALC: 41 % (ref 38–51)
HGB BLD-MCNC: 13.7 G/DL (ref 13.7–17.6)
HGB BLDA-MCNC: 13.9 G/DL (ref 12–17)
IMM GRANULOCYTES # BLD: 0.02 10*3/MM3 (ref 0–0.03)
IMM GRANULOCYTES NFR BLD: 0.3 % (ref 0–0.5)
LYMPHOCYTES # BLD AUTO: 1.35 10*3/MM3 (ref 0.9–4.8)
LYMPHOCYTES NFR BLD AUTO: 21 % (ref 19.6–45.3)
MCH RBC QN AUTO: 30.1 PG (ref 27–32.7)
MCHC RBC AUTO-ENTMCNC: 33.2 G/DL (ref 32.6–36.4)
MCV RBC AUTO: 90.8 FL (ref 79.8–96.2)
MONOCYTES # BLD AUTO: 0.61 10*3/MM3 (ref 0.2–1.2)
MONOCYTES NFR BLD AUTO: 9.5 % (ref 5–12)
NEUTROPHILS # BLD AUTO: 3.92 10*3/MM3 (ref 1.9–8.1)
NEUTROPHILS NFR BLD AUTO: 60.9 % (ref 42.7–76)
PCO2 BLDA: 48.9 MM HG (ref 35–45)
PH BLDA: 7.32 PH UNITS (ref 7.35–7.6)
PLATELET # BLD AUTO: 242 10*3/MM3 (ref 140–500)
PMV BLD AUTO: 10.7 FL (ref 6–12)
PO2 BLDA: 195 MMHG (ref 80–105)
POTASSIUM BLD-SCNC: 4.1 MMOL/L (ref 3.5–5.2)
POTASSIUM BLDA-SCNC: 3.4 MMOL/L (ref 3.5–4.9)
RBC # BLD AUTO: 4.55 10*6/MM3 (ref 4.6–6)
SAO2 % BLDA: 100 % (ref 95–98)
SODIUM BLD-SCNC: 141 MMOL/L (ref 136–145)
WBC NRBC COR # BLD: 6.43 10*3/MM3 (ref 4.5–10.7)

## 2017-09-28 PROCEDURE — 80048 BASIC METABOLIC PNL TOTAL CA: CPT | Performed by: INTERNAL MEDICINE

## 2017-09-28 PROCEDURE — 94799 UNLISTED PULMONARY SVC/PX: CPT

## 2017-09-28 PROCEDURE — 25810000003 SODIUM CHLORIDE 0.9 % WITH KCL 20 MEQ 20-0.9 MEQ/L-% SOLUTION: Performed by: NEUROLOGICAL SURGERY

## 2017-09-28 PROCEDURE — 25010000002 NEOSTIGMINE PER 0.5 MG: Performed by: ANESTHESIOLOGY

## 2017-09-28 PROCEDURE — 25010000002 FENTANYL CITRATE (PF) 100 MCG/2ML SOLUTION: Performed by: NURSE ANESTHETIST, CERTIFIED REGISTERED

## 2017-09-28 PROCEDURE — 82962 GLUCOSE BLOOD TEST: CPT

## 2017-09-28 PROCEDURE — 25010000002 DEXAMETHASONE PER 1 MG: Performed by: ANESTHESIOLOGY

## 2017-09-28 PROCEDURE — 25010000002 HEPARIN (PORCINE) PER 1000 UNITS: Performed by: NURSE ANESTHETIST, CERTIFIED REGISTERED

## 2017-09-28 PROCEDURE — 93882 EXTRACRANIAL UNI/LTD STUDY: CPT

## 2017-09-28 PROCEDURE — 25010000002 PROMETHAZINE PER 50 MG: Performed by: NURSE ANESTHETIST, CERTIFIED REGISTERED

## 2017-09-28 PROCEDURE — 25010000002 HEPARIN (PORCINE) PER 1000 UNITS: Performed by: NEUROLOGICAL SURGERY

## 2017-09-28 PROCEDURE — 85025 COMPLETE CBC W/AUTO DIFF WBC: CPT | Performed by: INTERNAL MEDICINE

## 2017-09-28 PROCEDURE — 35301 RECHANNELING OF ARTERY: CPT | Performed by: NEUROLOGICAL SURGERY

## 2017-09-28 PROCEDURE — 25010000002 PROPOFOL 10 MG/ML EMULSION: Performed by: NURSE ANESTHETIST, CERTIFIED REGISTERED

## 2017-09-28 PROCEDURE — 25010000002 PROTAMINE SULFATE PER 10 MG: Performed by: ANESTHESIOLOGY

## 2017-09-28 PROCEDURE — 25010000003 CEFAZOLIN IN DEXTROSE 2-4 GM/100ML-% SOLUTION: Performed by: NEUROLOGICAL SURGERY

## 2017-09-28 PROCEDURE — 63710000001 INSULIN ASPART PER 5 UNITS: Performed by: HOSPITALIST

## 2017-09-28 PROCEDURE — 25010000002 HYDROMORPHONE PER 4 MG: Performed by: ANESTHESIOLOGY

## 2017-09-28 PROCEDURE — 85347 COAGULATION TIME ACTIVATED: CPT

## 2017-09-28 PROCEDURE — 25010000002 PHENYLEPHRINE PER 1 ML: Performed by: NURSE ANESTHETIST, CERTIFIED REGISTERED

## 2017-09-28 PROCEDURE — 25010000002 MIDAZOLAM PER 1 MG: Performed by: ANESTHESIOLOGY

## 2017-09-28 PROCEDURE — 25010000002 FENTANYL CITRATE (PF) 100 MCG/2ML SOLUTION: Performed by: ANESTHESIOLOGY

## 2017-09-28 PROCEDURE — C1768 GRAFT, VASCULAR: HCPCS | Performed by: NEUROLOGICAL SURGERY

## 2017-09-28 PROCEDURE — 25010000002 ONDANSETRON PER 1 MG: Performed by: ANESTHESIOLOGY

## 2017-09-28 PROCEDURE — 25010000002 VANCOMYCIN PER 500 MG: Performed by: NEUROLOGICAL SURGERY

## 2017-09-28 DEVICE — PTCH VASC XENOSURE BIOLOGIC 1X6CM: Type: IMPLANTABLE DEVICE | Status: FUNCTIONAL

## 2017-09-28 RX ORDER — HYDROCODONE BITARTRATE AND ACETAMINOPHEN 7.5; 325 MG/1; MG/1
1 TABLET ORAL ONCE AS NEEDED
Status: DISCONTINUED | OUTPATIENT
Start: 2017-09-28 | End: 2017-09-28 | Stop reason: HOSPADM

## 2017-09-28 RX ORDER — GLYCOPYRROLATE 0.2 MG/ML
INJECTION INTRAMUSCULAR; INTRAVENOUS AS NEEDED
Status: DISCONTINUED | OUTPATIENT
Start: 2017-09-28 | End: 2017-09-28 | Stop reason: SURG

## 2017-09-28 RX ORDER — LIDOCAINE HYDROCHLORIDE 10 MG/ML
INJECTION, SOLUTION INFILTRATION; PERINEURAL AS NEEDED
Status: DISCONTINUED | OUTPATIENT
Start: 2017-09-28 | End: 2017-09-28 | Stop reason: HOSPADM

## 2017-09-28 RX ORDER — PROMETHAZINE HYDROCHLORIDE 25 MG/1
25 SUPPOSITORY RECTAL ONCE AS NEEDED
Status: COMPLETED | OUTPATIENT
Start: 2017-09-28 | End: 2017-09-28

## 2017-09-28 RX ORDER — SODIUM CHLORIDE 0.9 % (FLUSH) 0.9 %
1-10 SYRINGE (ML) INJECTION AS NEEDED
Status: DISCONTINUED | OUTPATIENT
Start: 2017-09-28 | End: 2017-09-28 | Stop reason: HOSPADM

## 2017-09-28 RX ORDER — LIDOCAINE HYDROCHLORIDE 20 MG/ML
INJECTION, SOLUTION INFILTRATION; PERINEURAL AS NEEDED
Status: DISCONTINUED | OUTPATIENT
Start: 2017-09-28 | End: 2017-09-28 | Stop reason: SURG

## 2017-09-28 RX ORDER — ONDANSETRON 2 MG/ML
4 INJECTION INTRAMUSCULAR; INTRAVENOUS ONCE AS NEEDED
Status: DISCONTINUED | OUTPATIENT
Start: 2017-09-28 | End: 2017-09-28 | Stop reason: HOSPADM

## 2017-09-28 RX ORDER — EPHEDRINE SULFATE 50 MG/ML
5 INJECTION, SOLUTION INTRAVENOUS ONCE AS NEEDED
Status: DISCONTINUED | OUTPATIENT
Start: 2017-09-28 | End: 2017-09-28 | Stop reason: HOSPADM

## 2017-09-28 RX ORDER — PROMETHAZINE HYDROCHLORIDE 25 MG/ML
12.5 INJECTION, SOLUTION INTRAMUSCULAR; INTRAVENOUS ONCE AS NEEDED
Status: COMPLETED | OUTPATIENT
Start: 2017-09-28 | End: 2017-09-28

## 2017-09-28 RX ORDER — NALOXONE HCL 0.4 MG/ML
0.2 VIAL (ML) INJECTION AS NEEDED
Status: DISCONTINUED | OUTPATIENT
Start: 2017-09-28 | End: 2017-09-28 | Stop reason: HOSPADM

## 2017-09-28 RX ORDER — ONDANSETRON 2 MG/ML
INJECTION INTRAMUSCULAR; INTRAVENOUS AS NEEDED
Status: DISCONTINUED | OUTPATIENT
Start: 2017-09-28 | End: 2017-09-28 | Stop reason: SURG

## 2017-09-28 RX ORDER — DEXAMETHASONE SODIUM PHOSPHATE 10 MG/ML
INJECTION INTRAMUSCULAR; INTRAVENOUS AS NEEDED
Status: DISCONTINUED | OUTPATIENT
Start: 2017-09-28 | End: 2017-09-28 | Stop reason: SURG

## 2017-09-28 RX ORDER — ROCURONIUM BROMIDE 10 MG/ML
INJECTION, SOLUTION INTRAVENOUS AS NEEDED
Status: DISCONTINUED | OUTPATIENT
Start: 2017-09-28 | End: 2017-09-28 | Stop reason: SURG

## 2017-09-28 RX ORDER — CEFAZOLIN SODIUM 2 G/100ML
2 INJECTION, SOLUTION INTRAVENOUS ONCE
Status: COMPLETED | OUTPATIENT
Start: 2017-09-28 | End: 2017-09-28

## 2017-09-28 RX ORDER — OXYCODONE AND ACETAMINOPHEN 7.5; 325 MG/1; MG/1
1 TABLET ORAL ONCE AS NEEDED
Status: DISCONTINUED | OUTPATIENT
Start: 2017-09-28 | End: 2017-09-28 | Stop reason: HOSPADM

## 2017-09-28 RX ORDER — PROTAMINE SULFATE 10 MG/ML
INJECTION, SOLUTION INTRAVENOUS AS NEEDED
Status: DISCONTINUED | OUTPATIENT
Start: 2017-09-28 | End: 2017-09-28 | Stop reason: SURG

## 2017-09-28 RX ORDER — FAMOTIDINE 20 MG/1
20 TABLET, FILM COATED ORAL EVERY 12 HOURS SCHEDULED
Status: DISCONTINUED | OUTPATIENT
Start: 2017-09-28 | End: 2017-09-29 | Stop reason: HOSPADM

## 2017-09-28 RX ORDER — HYDROMORPHONE HCL 110MG/55ML
PATIENT CONTROLLED ANALGESIA SYRINGE INTRAVENOUS AS NEEDED
Status: DISCONTINUED | OUTPATIENT
Start: 2017-09-28 | End: 2017-09-28 | Stop reason: SURG

## 2017-09-28 RX ORDER — CEFAZOLIN SODIUM 2 G/100ML
2 INJECTION, SOLUTION INTRAVENOUS EVERY 8 HOURS
Status: COMPLETED | OUTPATIENT
Start: 2017-09-28 | End: 2017-09-29

## 2017-09-28 RX ORDER — HYDROCODONE BITARTRATE AND ACETAMINOPHEN 5; 325 MG/1; MG/1
1 TABLET ORAL EVERY 4 HOURS PRN
Status: DISCONTINUED | OUTPATIENT
Start: 2017-09-28 | End: 2017-09-29 | Stop reason: HOSPADM

## 2017-09-28 RX ORDER — HYDROMORPHONE HYDROCHLORIDE 1 MG/ML
0.5 INJECTION, SOLUTION INTRAMUSCULAR; INTRAVENOUS; SUBCUTANEOUS
Status: DISCONTINUED | OUTPATIENT
Start: 2017-09-28 | End: 2017-09-28 | Stop reason: HOSPADM

## 2017-09-28 RX ORDER — HYDRALAZINE HYDROCHLORIDE 20 MG/ML
5 INJECTION INTRAMUSCULAR; INTRAVENOUS
Status: DISCONTINUED | OUTPATIENT
Start: 2017-09-28 | End: 2017-09-28 | Stop reason: HOSPADM

## 2017-09-28 RX ORDER — LABETALOL HYDROCHLORIDE 5 MG/ML
5 INJECTION, SOLUTION INTRAVENOUS
Status: DISCONTINUED | OUTPATIENT
Start: 2017-09-28 | End: 2017-09-28 | Stop reason: HOSPADM

## 2017-09-28 RX ORDER — DIPHENHYDRAMINE HYDROCHLORIDE 50 MG/ML
12.5 INJECTION INTRAMUSCULAR; INTRAVENOUS
Status: DISCONTINUED | OUTPATIENT
Start: 2017-09-28 | End: 2017-09-28 | Stop reason: HOSPADM

## 2017-09-28 RX ORDER — MIDAZOLAM HYDROCHLORIDE 1 MG/ML
2 INJECTION INTRAMUSCULAR; INTRAVENOUS
Status: DISCONTINUED | OUTPATIENT
Start: 2017-09-28 | End: 2017-09-28 | Stop reason: HOSPADM

## 2017-09-28 RX ORDER — EPHEDRINE SULFATE 50 MG/ML
INJECTION, SOLUTION INTRAVENOUS AS NEEDED
Status: DISCONTINUED | OUTPATIENT
Start: 2017-09-28 | End: 2017-09-28 | Stop reason: SURG

## 2017-09-28 RX ORDER — NITROGLYCERIN 0.4 MG/1
0.4 TABLET SUBLINGUAL
Status: DISCONTINUED | OUTPATIENT
Start: 2017-09-28 | End: 2017-09-29 | Stop reason: HOSPADM

## 2017-09-28 RX ORDER — FENTANYL CITRATE 50 UG/ML
50 INJECTION, SOLUTION INTRAMUSCULAR; INTRAVENOUS
Status: DISCONTINUED | OUTPATIENT
Start: 2017-09-28 | End: 2017-09-28 | Stop reason: HOSPADM

## 2017-09-28 RX ORDER — SODIUM CHLORIDE, SODIUM LACTATE, POTASSIUM CHLORIDE, CALCIUM CHLORIDE 600; 310; 30; 20 MG/100ML; MG/100ML; MG/100ML; MG/100ML
9 INJECTION, SOLUTION INTRAVENOUS CONTINUOUS
Status: DISCONTINUED | OUTPATIENT
Start: 2017-09-28 | End: 2017-09-29 | Stop reason: HOSPADM

## 2017-09-28 RX ORDER — PROMETHAZINE HYDROCHLORIDE 25 MG/1
12.5 TABLET ORAL ONCE AS NEEDED
Status: DISCONTINUED | OUTPATIENT
Start: 2017-09-28 | End: 2017-09-28 | Stop reason: HOSPADM

## 2017-09-28 RX ORDER — FAMOTIDINE 10 MG/ML
20 INJECTION, SOLUTION INTRAVENOUS EVERY 12 HOURS SCHEDULED
Status: DISCONTINUED | OUTPATIENT
Start: 2017-09-28 | End: 2017-09-29 | Stop reason: HOSPADM

## 2017-09-28 RX ORDER — MIDAZOLAM HYDROCHLORIDE 1 MG/ML
1 INJECTION INTRAMUSCULAR; INTRAVENOUS
Status: DISCONTINUED | OUTPATIENT
Start: 2017-09-28 | End: 2017-09-28 | Stop reason: HOSPADM

## 2017-09-28 RX ORDER — MORPHINE SULFATE 2 MG/ML
1 INJECTION, SOLUTION INTRAMUSCULAR; INTRAVENOUS EVERY 4 HOURS PRN
Status: DISCONTINUED | OUTPATIENT
Start: 2017-09-28 | End: 2017-09-29 | Stop reason: HOSPADM

## 2017-09-28 RX ORDER — FAMOTIDINE 10 MG/ML
20 INJECTION, SOLUTION INTRAVENOUS ONCE
Status: COMPLETED | OUTPATIENT
Start: 2017-09-28 | End: 2017-09-28

## 2017-09-28 RX ORDER — HEPARIN SODIUM 1000 [USP'U]/ML
INJECTION, SOLUTION INTRAVENOUS; SUBCUTANEOUS AS NEEDED
Status: DISCONTINUED | OUTPATIENT
Start: 2017-09-28 | End: 2017-09-28 | Stop reason: SURG

## 2017-09-28 RX ORDER — FLUMAZENIL 0.1 MG/ML
0.2 INJECTION INTRAVENOUS AS NEEDED
Status: DISCONTINUED | OUTPATIENT
Start: 2017-09-28 | End: 2017-09-28 | Stop reason: HOSPADM

## 2017-09-28 RX ORDER — SODIUM CHLORIDE AND POTASSIUM CHLORIDE 150; 900 MG/100ML; MG/100ML
100 INJECTION, SOLUTION INTRAVENOUS CONTINUOUS
Status: DISCONTINUED | OUTPATIENT
Start: 2017-09-28 | End: 2017-09-29 | Stop reason: HOSPADM

## 2017-09-28 RX ORDER — FENTANYL CITRATE 50 UG/ML
INJECTION, SOLUTION INTRAMUSCULAR; INTRAVENOUS AS NEEDED
Status: DISCONTINUED | OUTPATIENT
Start: 2017-09-28 | End: 2017-09-28 | Stop reason: SURG

## 2017-09-28 RX ORDER — PROMETHAZINE HYDROCHLORIDE 25 MG/1
25 TABLET ORAL ONCE AS NEEDED
Status: COMPLETED | OUTPATIENT
Start: 2017-09-28 | End: 2017-09-28

## 2017-09-28 RX ORDER — NALOXONE HCL 0.4 MG/ML
0.4 VIAL (ML) INJECTION
Status: DISCONTINUED | OUTPATIENT
Start: 2017-09-28 | End: 2017-09-29 | Stop reason: HOSPADM

## 2017-09-28 RX ORDER — PROPOFOL 10 MG/ML
VIAL (ML) INTRAVENOUS AS NEEDED
Status: DISCONTINUED | OUTPATIENT
Start: 2017-09-28 | End: 2017-09-28 | Stop reason: SURG

## 2017-09-28 RX ADMIN — ROCURONIUM BROMIDE 20 MG: 10 INJECTION INTRAVENOUS at 14:54

## 2017-09-28 RX ADMIN — PANTOPRAZOLE SODIUM 40 MG: 40 TABLET, DELAYED RELEASE ORAL at 05:50

## 2017-09-28 RX ADMIN — PROMETHAZINE HYDROCHLORIDE 6.25 MG: 25 INJECTION INTRAMUSCULAR; INTRAVENOUS at 17:19

## 2017-09-28 RX ADMIN — LIDOCAINE HYDROCHLORIDE 60 MG: 20 INJECTION, SOLUTION INFILTRATION; PERINEURAL at 13:32

## 2017-09-28 RX ADMIN — ROCURONIUM BROMIDE 10 MG: 10 INJECTION INTRAVENOUS at 14:29

## 2017-09-28 RX ADMIN — INSULIN ASPART 3 UNITS: 100 INJECTION, SOLUTION INTRAVENOUS; SUBCUTANEOUS at 21:47

## 2017-09-28 RX ADMIN — POTASSIUM CHLORIDE AND SODIUM CHLORIDE 100 ML/HR: 900; 150 INJECTION, SOLUTION INTRAVENOUS at 21:46

## 2017-09-28 RX ADMIN — EPHEDRINE SULFATE 10 MG: 50 INJECTION INTRAMUSCULAR; INTRAVENOUS; SUBCUTANEOUS at 13:39

## 2017-09-28 RX ADMIN — PHENYLEPHRINE HYDROCHLORIDE 100 MCG: 10 INJECTION INTRAVENOUS at 13:43

## 2017-09-28 RX ADMIN — ATORVASTATIN CALCIUM 80 MG: 80 TABLET, FILM COATED ORAL at 21:45

## 2017-09-28 RX ADMIN — PHENYLEPHRINE HYDROCHLORIDE 100 MCG: 10 INJECTION INTRAVENOUS at 15:15

## 2017-09-28 RX ADMIN — SODIUM CHLORIDE, POTASSIUM CHLORIDE, SODIUM LACTATE AND CALCIUM CHLORIDE 9 ML/HR: 600; 310; 30; 20 INJECTION, SOLUTION INTRAVENOUS at 10:57

## 2017-09-28 RX ADMIN — PHENYLEPHRINE HYDROCHLORIDE 100 MCG: 10 INJECTION INTRAVENOUS at 15:05

## 2017-09-28 RX ADMIN — NEOSTIGMINE METHYLSULFATE 2 MG: 1 INJECTION INTRAMUSCULAR; INTRAVENOUS; SUBCUTANEOUS at 15:52

## 2017-09-28 RX ADMIN — GLYCOPYRROLATE 0.4 MG: 0.2 INJECTION INTRAMUSCULAR; INTRAVENOUS at 15:52

## 2017-09-28 RX ADMIN — CEFAZOLIN SODIUM 2 G: 2 INJECTION, SOLUTION INTRAVENOUS at 21:46

## 2017-09-28 RX ADMIN — PROPOFOL 150 MG: 10 INJECTION, EMULSION INTRAVENOUS at 13:32

## 2017-09-28 RX ADMIN — FAMOTIDINE 20 MG: 20 TABLET, FILM COATED ORAL at 21:45

## 2017-09-28 RX ADMIN — GLIPIZIDE 5 MG: 5 TABLET ORAL at 21:46

## 2017-09-28 RX ADMIN — DEXAMETHASONE SODIUM PHOSPHATE 10 MG: 10 INJECTION INTRAMUSCULAR; INTRAVENOUS at 15:41

## 2017-09-28 RX ADMIN — PHENYLEPHRINE HYDROCHLORIDE 100 MCG: 10 INJECTION INTRAVENOUS at 13:41

## 2017-09-28 RX ADMIN — PHENYLEPHRINE HYDROCHLORIDE 100 MCG: 10 INJECTION INTRAVENOUS at 13:57

## 2017-09-28 RX ADMIN — FAMOTIDINE 20 MG: 10 INJECTION, SOLUTION INTRAVENOUS at 10:48

## 2017-09-28 RX ADMIN — EPHEDRINE SULFATE 15 MG: 50 INJECTION INTRAMUSCULAR; INTRAVENOUS; SUBCUTANEOUS at 13:36

## 2017-09-28 RX ADMIN — EPHEDRINE SULFATE 5 MG: 50 INJECTION INTRAMUSCULAR; INTRAVENOUS; SUBCUTANEOUS at 14:28

## 2017-09-28 RX ADMIN — PROTAMINE SULFATE 25 MG: 10 INJECTION, SOLUTION INTRAVENOUS at 15:37

## 2017-09-28 RX ADMIN — FENTANYL CITRATE 50 MCG: 50 INJECTION INTRAMUSCULAR; INTRAVENOUS at 13:45

## 2017-09-28 RX ADMIN — FENTANYL CITRATE 50 MCG: 50 INJECTION INTRAMUSCULAR; INTRAVENOUS at 13:32

## 2017-09-28 RX ADMIN — AMITRIPTYLINE HYDROCHLORIDE 10 MG: 10 TABLET, FILM COATED ORAL at 21:45

## 2017-09-28 RX ADMIN — CEFAZOLIN SODIUM 2 G: 2 INJECTION, SOLUTION INTRAVENOUS at 14:04

## 2017-09-28 RX ADMIN — FENTANYL CITRATE 50 MCG: 50 INJECTION, SOLUTION INTRAMUSCULAR; INTRAVENOUS at 10:58

## 2017-09-28 RX ADMIN — MONTELUKAST 10 MG: 10 TABLET, FILM COATED ORAL at 21:45

## 2017-09-28 RX ADMIN — SODIUM CHLORIDE, POTASSIUM CHLORIDE, SODIUM LACTATE AND CALCIUM CHLORIDE: 600; 310; 30; 20 INJECTION, SOLUTION INTRAVENOUS at 14:59

## 2017-09-28 RX ADMIN — PHENYLEPHRINE HYDROCHLORIDE 100 MCG: 10 INJECTION INTRAVENOUS at 14:49

## 2017-09-28 RX ADMIN — PHENYLEPHRINE HYDROCHLORIDE 50 MCG: 10 INJECTION INTRAVENOUS at 14:28

## 2017-09-28 RX ADMIN — HEPARIN SODIUM 7000 UNITS: 1000 INJECTION, SOLUTION INTRAVENOUS; SUBCUTANEOUS at 14:49

## 2017-09-28 RX ADMIN — EPHEDRINE SULFATE 10 MG: 50 INJECTION INTRAMUSCULAR; INTRAVENOUS; SUBCUTANEOUS at 14:19

## 2017-09-28 RX ADMIN — HYDROMORPHONE HYDROCHLORIDE 0.5 MG: 2 INJECTION, SOLUTION INTRAMUSCULAR; INTRAVENOUS; SUBCUTANEOUS at 15:47

## 2017-09-28 RX ADMIN — MIDAZOLAM 2 MG: 1 INJECTION INTRAMUSCULAR; INTRAVENOUS at 10:43

## 2017-09-28 RX ADMIN — HEPARIN SODIUM 1000 UNITS: 1000 INJECTION, SOLUTION INTRAVENOUS; SUBCUTANEOUS at 15:03

## 2017-09-28 RX ADMIN — EPHEDRINE SULFATE 10 MG: 50 INJECTION INTRAMUSCULAR; INTRAVENOUS; SUBCUTANEOUS at 14:25

## 2017-09-28 RX ADMIN — ROCURONIUM BROMIDE 40 MG: 10 INJECTION INTRAVENOUS at 13:32

## 2017-09-28 RX ADMIN — PHENYLEPHRINE HYDROCHLORIDE 50 MCG: 10 INJECTION INTRAVENOUS at 14:22

## 2017-09-28 RX ADMIN — METOPROLOL TARTRATE 50 MG: 50 TABLET, FILM COATED ORAL at 10:55

## 2017-09-28 RX ADMIN — ONDANSETRON 4 MG: 2 INJECTION INTRAMUSCULAR; INTRAVENOUS at 15:37

## 2017-09-28 NOTE — ANESTHESIA PROCEDURE NOTES
Airway  Urgency: elective    Date/Time: 9/28/2017 2:16 PM  End Time:9/28/2017 2:16 PM  Airway not difficult    General Information and Staff    Patient location during procedure: OR  Anesthesiologist: CARMEL HODGE  CRNA: KELTON MANTILLA    Indications and Patient Condition  Indications for airway management: airway protection    Preoxygenated: yes  Mask difficulty assessment: 1 - vent by mask    Final Airway Details  Final airway type: endotracheal airway      Successful airway: ETT  Cuffed: yes   Successful intubation technique: direct laryngoscopy  Endotracheal tube insertion site: oral  Blade: Archuleta  Blade size: #2  ETT size: 8.0 mm  Placement verified by: chest auscultation and capnometry   Measured from: lips  Number of attempts at approach: 1    Additional Comments  Atraumatic, MOP to cuff, BSBE, no change to dentition, secured with tape

## 2017-09-28 NOTE — ANESTHESIA PROCEDURE NOTES
Arterial Line    Patient location during procedure: pre-op  Start time: 9/28/2017 10:39 AM  Stop Time:9/28/2017 10:45 AM       Line placed for hemodynamic monitoring.  Performed By   Anesthesiologist: CARMEL HODGE  Preanesthetic Checklist  Completed: patient identified, site marked, surgical consent, pre-op evaluation, timeout performed, IV checked, risks and benefits discussed and monitors and equipment checked  Arterial Line Prep   Sterile Tech: gloves and cap  Prep: ChloraPrep  Patient monitoring: blood pressure monitoring, continuous pulse oximetry and EKG  Arterial Line Procedure   Laterality:right  Location:  radial artery  Catheter size: 20 G   Guidance: landmark technique  Number of attempts: 1  Successful placement: yes          Post Assessment   Dressing Type: occlusive dressing applied, secured with tape and wrist guard applied.   Complications no  Circ/Move/Sens Assessment: normal.   Patient Tolerance: patient tolerated the procedure well with no apparent complications

## 2017-09-28 NOTE — ANESTHESIA PREPROCEDURE EVALUATION
Anesthesia Evaluation     Patient summary reviewed and Nursing notes reviewed   no history of anesthetic complications:  NPO Solid Status: > 6 hours  NPO Liquid Status: > 6 hours     Airway   Mallampati: II  TM distance: >3 FB  Neck ROM: full  no difficulty expected  Dental - normal exam   (+) lower dentures and upper dentures    Pulmonary - normal exam    breath sounds clear to auscultation  (+) COPD,   (-) rhonchi, decreased breath sounds, wheezes, rales, stridor  Cardiovascular - normal exam    ECG reviewed  Patient on routine beta blocker and Beta blocker given within 24 hours of surgery  Rhythm: regular  Rate: normal    (+) hypertension, CAD, cardiac stents more than 12 months ago PVD,   (-) murmur, weak pulses, friction rub, systolic click, carotid bruits, JVD, peripheral edema      Neuro/Psych  (+) TIA,    GI/Hepatic/Renal/Endo    (+)  diabetes mellitus type 2,     Musculoskeletal (-) negative ROS    Abdominal  - normal exam    Abdomen: soft.   Substance History - negative use     OB/GYN negative ob/gyn ROS         Other - negative ROS                                       Anesthesia Plan    ASA 3     general     intravenous induction   Anesthetic plan and risks discussed with patient.

## 2017-09-28 NOTE — ANESTHESIA POSTPROCEDURE EVALUATION
"Patient: Ted Glover    Procedure Summary     Date Anesthesia Start Anesthesia Stop Room / Location    09/28/17 1320 1629  [facility] / [facility]       Procedure Diagnosis Surgeon Provider    CAROTID ENDARTERECTOMY (Left Neck) No diagnosis on file. MD Nawaf Walden MD          Anesthesia Type: general  Last vitals  BP   112/65 (09/28/17 1718)    Temp        Pulse   56 (09/28/17 1718)   Resp   16 (09/28/17 1718)    SpO2   93 % (09/28/17 1718)      Post Anesthesia Care and Evaluation    Patient location during evaluation: PACU  Patient participation: complete - patient participated  Level of consciousness: awake and alert  Pain management: adequate  Airway patency: patent  Anesthetic complications: No anesthetic complications  PONV Status: none  Cardiovascular status: acceptable  Respiratory status: acceptable  Hydration status: acceptable    Comments: /65  Pulse 56  Temp 36.4 °C (97.5 °F) (Oral)   Resp 16  Ht 68\" (172.7 cm)  Wt 205 lb (93 kg)  SpO2 93%  BMI 31.17 kg/m2        "

## 2017-09-29 ENCOUNTER — TELEPHONE (OUTPATIENT)
Dept: NEUROSURGERY | Facility: CLINIC | Age: 72
End: 2017-09-29

## 2017-09-29 VITALS
WEIGHT: 205 LBS | HEIGHT: 68 IN | HEART RATE: 63 BPM | TEMPERATURE: 97.6 F | SYSTOLIC BLOOD PRESSURE: 116 MMHG | BODY MASS INDEX: 31.07 KG/M2 | RESPIRATION RATE: 18 BRPM | OXYGEN SATURATION: 96 % | DIASTOLIC BLOOD PRESSURE: 59 MMHG

## 2017-09-29 DIAGNOSIS — I65.23 CAROTID STENOSIS, ASYMPTOMATIC, BILATERAL: Primary | ICD-10-CM

## 2017-09-29 LAB
ACT BLD: 109 SECONDS (ref 82–152)
ACT BLD: 180 SECONDS (ref 82–152)
ACT BLD: 202 SECONDS (ref 82–152)
BASOPHILS # BLD AUTO: 0.01 10*3/MM3 (ref 0–0.2)
BASOPHILS NFR BLD AUTO: 0.1 % (ref 0–1.5)
BH CV XLRA MEAS LEFT DIST CCA EDV: 16 CM/SEC
BH CV XLRA MEAS LEFT DIST CCA PSV: 78 CM/SEC
BH CV XLRA MEAS LEFT PROX ICA EDV: 30 CM/SEC
BH CV XLRA MEAS LEFT PROX ICA PSV: 80 CM/SEC
DEPRECATED RDW RBC AUTO: 53.3 FL (ref 37–54)
EOSINOPHIL # BLD AUTO: 0.01 10*3/MM3 (ref 0–0.7)
EOSINOPHIL NFR BLD AUTO: 0.1 % (ref 0.3–6.2)
ERYTHROCYTE [DISTWIDTH] IN BLOOD BY AUTOMATED COUNT: 15 % (ref 11.5–14.5)
GLUCOSE BLDC GLUCOMTR-MCNC: 161 MG/DL (ref 70–130)
GLUCOSE BLDC GLUCOMTR-MCNC: 219 MG/DL (ref 70–130)
HCT VFR BLD AUTO: 43.6 % (ref 40.4–52.2)
HGB BLD-MCNC: 13.4 G/DL (ref 13.7–17.6)
HYPOCHROMIA BLD QL: NORMAL
IMM GRANULOCYTES # BLD: 0.04 10*3/MM3 (ref 0–0.03)
IMM GRANULOCYTES NFR BLD: 0.4 % (ref 0–0.5)
LYMPHOCYTES # BLD AUTO: 0.55 10*3/MM3 (ref 0.9–4.8)
LYMPHOCYTES NFR BLD AUTO: 5.1 % (ref 19.6–45.3)
MCH RBC QN AUTO: 30 PG (ref 27–32.7)
MCHC RBC AUTO-ENTMCNC: 30.7 G/DL (ref 32.6–36.4)
MCV RBC AUTO: 97.8 FL (ref 79.8–96.2)
MONOCYTES # BLD AUTO: 0.55 10*3/MM3 (ref 0.2–1.2)
MONOCYTES NFR BLD AUTO: 5.1 % (ref 5–12)
NEUTROPHILS # BLD AUTO: 9.69 10*3/MM3 (ref 1.9–8.1)
NEUTROPHILS NFR BLD AUTO: 89.2 % (ref 42.7–76)
PLAT MORPH BLD: NORMAL
PLATELET # BLD AUTO: 228 10*3/MM3 (ref 140–500)
PMV BLD AUTO: 11.5 FL (ref 6–12)
RBC # BLD AUTO: 4.46 10*6/MM3 (ref 4.6–6)
WBC MORPH BLD: NORMAL
WBC NRBC COR # BLD: 10.85 10*3/MM3 (ref 4.5–10.7)

## 2017-09-29 PROCEDURE — 85025 COMPLETE CBC W/AUTO DIFF WBC: CPT | Performed by: NEUROLOGICAL SURGERY

## 2017-09-29 PROCEDURE — 03CN3ZZ EXTIRPATION OF MATTER FROM LEFT EXTERNAL CAROTID ARTERY, PERCUTANEOUS APPROACH: ICD-10-PCS | Performed by: NEUROLOGICAL SURGERY

## 2017-09-29 PROCEDURE — 99231 SBSQ HOSP IP/OBS SF/LOW 25: CPT | Performed by: PSYCHIATRY & NEUROLOGY

## 2017-09-29 PROCEDURE — 92610 EVALUATE SWALLOWING FUNCTION: CPT

## 2017-09-29 PROCEDURE — 03CJ3ZZ EXTIRPATION OF MATTER FROM LEFT COMMON CAROTID ARTERY, PERCUTANEOUS APPROACH: ICD-10-PCS | Performed by: NEUROLOGICAL SURGERY

## 2017-09-29 PROCEDURE — 03UJ3JZ SUPPLEMENT LEFT COMMON CAROTID ARTERY WITH SYNTHETIC SUBSTITUTE, PERCUTANEOUS APPROACH: ICD-10-PCS | Performed by: NEUROLOGICAL SURGERY

## 2017-09-29 PROCEDURE — 82962 GLUCOSE BLOOD TEST: CPT

## 2017-09-29 PROCEDURE — 25010000003 CEFAZOLIN IN DEXTROSE 2-4 GM/100ML-% SOLUTION: Performed by: NEUROLOGICAL SURGERY

## 2017-09-29 PROCEDURE — 85007 BL SMEAR W/DIFF WBC COUNT: CPT | Performed by: NEUROLOGICAL SURGERY

## 2017-09-29 PROCEDURE — 99024 POSTOP FOLLOW-UP VISIT: CPT | Performed by: NURSE PRACTITIONER

## 2017-09-29 PROCEDURE — 63710000001 INSULIN ASPART PER 5 UNITS: Performed by: HOSPITALIST

## 2017-09-29 PROCEDURE — 03CL3ZZ EXTIRPATION OF MATTER FROM LEFT INTERNAL CAROTID ARTERY, PERCUTANEOUS APPROACH: ICD-10-PCS | Performed by: NEUROLOGICAL SURGERY

## 2017-09-29 RX ORDER — ACETAMINOPHEN 325 MG/1
650 TABLET ORAL EVERY 6 HOURS PRN
Start: 2017-09-29 | End: 2018-05-16 | Stop reason: CLARIF

## 2017-09-29 RX ORDER — CLOPIDOGREL BISULFATE 75 MG/1
75 TABLET ORAL DAILY
Qty: 30 TABLET | Refills: 0 | Status: SHIPPED | OUTPATIENT
Start: 2017-09-29 | End: 2017-11-03 | Stop reason: SDUPTHER

## 2017-09-29 RX ORDER — GLIPIZIDE 10 MG/1
10 TABLET ORAL 2 TIMES DAILY
Status: DISCONTINUED | OUTPATIENT
Start: 2017-09-29 | End: 2017-09-29 | Stop reason: HOSPADM

## 2017-09-29 RX ORDER — ATORVASTATIN CALCIUM 80 MG/1
80 TABLET, FILM COATED ORAL NIGHTLY
Qty: 30 TABLET | Refills: 0 | Status: SHIPPED | OUTPATIENT
Start: 2017-09-29

## 2017-09-29 RX ADMIN — GLIPIZIDE 5 MG: 5 TABLET ORAL at 08:39

## 2017-09-29 RX ADMIN — METOPROLOL TARTRATE 50 MG: 50 TABLET, FILM COATED ORAL at 08:39

## 2017-09-29 RX ADMIN — INSULIN ASPART 3 UNITS: 100 INJECTION, SOLUTION INTRAVENOUS; SUBCUTANEOUS at 12:06

## 2017-09-29 RX ADMIN — CEFAZOLIN SODIUM 2 G: 2 INJECTION, SOLUTION INTRAVENOUS at 06:42

## 2017-09-29 RX ADMIN — INSULIN ASPART 2 UNITS: 100 INJECTION, SOLUTION INTRAVENOUS; SUBCUTANEOUS at 08:42

## 2017-09-29 RX ADMIN — FAMOTIDINE 20 MG: 20 TABLET, FILM COATED ORAL at 08:39

## 2017-09-29 RX ADMIN — PANTOPRAZOLE SODIUM 40 MG: 40 TABLET, DELAYED RELEASE ORAL at 06:41

## 2017-09-29 NOTE — TELEPHONE ENCOUNTER
----- Message from SOL Stephens sent at 9/29/2017 12:06 PM EDT -----  Please arrange for follow-up in one month with bilateral carotid Doppler study s/p L CEA for severe carotid stenosis. thx!

## 2017-10-02 NOTE — PAYOR COMM NOTE
"Ted Mcnair (72 y.o. Male)                                               REF# [id number]                                        CONTACT=   RONAL LUBIN                                          FAX   [phone]                                                 [phone]                                                             DISCHARGE SUMMARY ATTACHED          Date of Birth Social Security Number Address Home Phone MRN    1945  [address] [facility] [address] [phone] [id number]    [employer] Mar[employer] Status          Unknown        Admission Date Admission Type Admitting Provider Attending Provider Department, Room/Bed    9/26/17 Urgent Pierce Landaverde MD  [address], 564/1    Discharge Date Discharge Disposition Discharge Destination        9/29/2017 Home or Self Care             Attending Provider: (none)    Allergies:  No Known Allergies    Isolation:  None   Infection:  None   Code Status:  Prior    Ht:  68\" (172.7 cm)   Wt:  205 lb (93 kg)    Admission Cmt:  None   Principal Problem:  Amaurosis fugax, left eye x 3 episodes [G45.3]                 Active Insurance as of 9/26/2017     Primary Coverage     Payor Plan Insurance Group Employer/Plan Group    AETNA MEDICARE REPLACEMENT AETNA [id number]     Payor Plan Address Payor Plan Phone Number Effective From Effective To    [address] [phone] 1/1/2017     [place] [address]       Subscriber Name Subscriber Birth Date Member ID       TED MCNAIR 1945 [id number]                 Emergency Contacts      (Rel.) Home Phone Work Phone Mobile Phone    Radha Mcnair (Spouse) [phone] -- --            Insurance Information                AETNA MEDICARE REPLACEMENT/AETNA Phone: [phone]    Subscriber: Ted Mcnair Subscriber#: [id number]    Group#: [id number] Precert#:              Discharge Summary      Kerry Gilliam MD at 9/29/2017  1:36 PM             "     NAME: Ted Glover ADMIT: 2017   : 1945  PCP: Abad Archuleta DO    MRN: [id number] LOS: 3 days   AGE/SEX: 72 y.o. male  ROOM: 4/     Date of Admission:  2017  Date of Discharge:  2017    PCP: Abad Archuleta DO      CHIEF COMPLAINT  No chief complaint on file.      DISCHARGE DIAGNOSIS  Active Hospital Problems (** Indicates Principal Problem)    Diagnosis Date Noted   • **Amaurosis fugax, left eye x 3 episodes [G45.3] 2017   • Arthritis [M19.90] 2017   • COPD (chronic obstructive pulmonary disease) [J44.9] 2017   • Diabetes mellitus [E11.9] 2017   • Hypertension [I10] 2017   • Stenosis of left carotid artery, left 90 % on MRA neck [I65.22] 2017   • CAD (coronary artery disease), hx stent [I25.10] 2017   • Carotid stenosis, symptomatic w/o infarct [I65.29] 2017      Resolved Hospital Problems    Diagnosis Date Noted Date Resolved   No resolved problems to display.       SECONDARY DIAGNOSES  Past Medical History:   Diagnosis Date   • Arthritis    • Bronchitis    • CAD (coronary artery disease), hx stent 2017   • COPD (chronic obstructive pulmonary disease)    • Diabetes mellitus    • Hypertension        CONSULTS   None  Consult Orders (all)     Start     Ordered    17  Inpatient Consult to Neurology  Once     Specialty:  Neurology  Provider:  Portillo Olivia MD    17  Inpatient Consult to Cardiology  Once     Specialty:  Cardiology  Provider:  Jim Pedroza MD    17  Inpatient Consult to Vascular Surgery  Once     Specialty:  Vascular Surgery  Provider:  Jorge Garber MD    17  Consult to Case Management   Once     Provider:  (Not yet assigned)    17  Consult to Diabetes Educator  Once     Provider:  (Not yet assigned)    17  Inpatient Consult to Spiritual  Care  Once     Provider:  (Not yet assigned)    09/26/17 2043 09/26/17 2044  Inpatient Consult to Neurosurgery  Once     Specialty:  Neurosurgery  Provider:  Salvador Morales MD    09/26/17 2043            PROCEDURES PERFORMED  left carotid endarterectomy    HOSPITAL COURSE  72-year-old male who presented with 3 episodes of transient visual loss in the left eye lasting about 10-20 minutes each.  He was seen at [facility] and then MRI/MRA revealed 90% stenosis of the left internal carotid artery.  He was transferred here and seen by neurology, neurosurgery, cardiology and vascular surgery.  He had a Cardiolite stress test that came back normal and thus received cardiac clearance to undergo left carotid endarterectomy.  He had this yesterday and tolerated the procedure well.  He has been cleared by all consultants for discharge to home    PHYSICAL EXAM  Temp:  [96.8 °F (36 °C)-97.8 °F (36.6 °C)] 97.6 °F (36.4 °C)  Heart Rate:  [52-91] 63  Resp:  [14-20] 18  BP: ()/(52-70) 116/59  Arterial Line BP: (105-135)/(38-56) 114/48  Body mass index is 31.17 kg/(m^2).  Physical Exam  See progress note    CONDITION ON DISCHARGE  Stable.      DISCHARGE DISPOSITION   Home      DISCHARGE MEDICATIONS   Ted Glover   Home Medication Instructions SERGEY:[id number]    Printed on:09/29/17 1622   Medication Information                      acetaminophen (TYLENOL) 325 MG tablet  Take 2 tablets by mouth Every 6 (Six) Hours As Needed for Mild Pain .             amitriptyline (ELAVIL) 10 MG tablet  Take 10 mg by mouth Every Night.             amLODIPine (NORVASC) 5 MG tablet  Take 5 mg by mouth Daily.             atorvastatin (LIPITOR) 80 MG tablet  Take 1 tablet by mouth Every Night.             clobetasol (TEMOVATE) 0.05 % cream  Apply  topically 2 (Two) Times a Day.             clopidogrel (PLAVIX) 75 MG tablet  Take 1 tablet by mouth Daily.             dextrose (GLUTOSE) 40 % gel  Take 15 g by mouth Every 15  (Fifteen) Minutes As Needed for Low Blood Sugar.             GLIPIZIDE PO  Take 5 mg by mouth 2 (Two) Times a Day. unk dose / frequency              metFORMIN (GLUCOPHAGE) 500 MG tablet  Take 1,000 mg by mouth 2 (Two) Times a Day With Meals.             metoprolol tartrate (LOPRESSOR) 50 MG tablet  Take 50 mg by mouth Daily.             montelukast (SINGULAIR) 10 MG tablet  Take 10 mg by mouth Every Night.                No future appointments.  Additional Instructions for the Follow-ups that You Need to Schedule     Discharge Follow-Up With Specified Provider    As directed    To:  Dr Morales   Follow Up:  1 Month       Discharge Follow-up with PCP    As directed    Follow Up Details:  2 wks       Discharge Follow-up with Specialty    As directed    Specialty:  stroke clinic   Follow Up:  3 Months             Follow-up Information     Follow up with Abad Archuleta DO .    Specialties:  Family Medicine, Emergency Medicine    Why:  2 wks    Contact information:    [address] [place] [address] [phone]          Follow up with João Matamoros MD. Schedule an appointment as soon as possible for a visit in 6 month(s).    Specialty:  Cardiology    Why:  6 months cardiac followup    Contact information:    [address]  [address]  [facility] [id number] [phone]          Follow up with Salvador Morales MD Follow up in 1 month(s).    Specialties:  Neurosurgery, Radiology    Why:  call to arrange 1 month follow up with bilateral carotid doppler    Contact information:    [address]  [address]  [facility] [id number] [phone]            TEST  RESULTS PENDING AT DISCHARGE         Kerry Gilliam MD  [facility]  09/29/17  4:22 PM      Time: greater than 30 minutes.     Electronically signed by Kerry Gilliam MD at 9/29/2017  4:22 PM

## 2017-10-09 NOTE — OP NOTE
CAROTID ENDARTERECTOMY      Carotid Endarterectomy   Procedure Note    Ted Glover  9/26/2017 - 9/28/2017  [id number]    SURGEON  Salvador Morales MD    ASSISTANT:  Tracy Beth CSA  INDICATION:  Severe carotid stenosis    Pre-op Diagnosis:   symptomatic left carotid stensois  Post-op Diagnosis:     symptomatic left carotid stensois    PROCEDURE PERFORMED:  Procedure(s):  LEFT CAROTID ENDARTERECTOMY    Anesthesia: General    Staff:   Circulator: Cristal Pederson RN  Scrub Person: Bibi Logan  Assistant: Tracy Beth CSA    Estimated Blood Loss: minimal    Specimens:                * No orders in the log *      Drains:       [REMOVED] Urethral Catheter 09/28/17 1410 16 10 (Removed)   Removed 09/28/17 1610       Findings: Severe carotid stenosis - greater than 90%    Complications: None    Details:    The patient was brought to the operating room and general endotracheal anesthesia. A baseline ACT was obtained.    The head was turned to the right  and a bump was placed behind the shoulder and the neck was extended. The left side of the neck shaved prepped and draped in the usual sterile manner.     A horizontal incision was made through the skin, subcutaneous tissues and the platysma. The anterior cervical fascia was opened along the anterior border of the sternocleidomastoid muscle. The facial vein was skeletonized and divided. The carotid artery was skeletonized identifying the common and  Internal and external carotid arteries and the ascending pharyngeal artery. Vessel loops were place around the carotids. Remmel loops were placed around  the common and internal carotid arteries.     The patient was heparinized to 2X the baseline value.    A vascular clamp was placed on the internal carotid, the ascending pharyngeal/external carotid complex, and the common carotid artery in that sequence.    A #11 blade was used to enter the common carotid artery and the arterotomy was  extended with Phillips scissors int the internal carotid to a point above the area of stenosis.     Endarterectomy was accomplished with copious heparinized irrigation and with gold handled forceps, micro ring forceps, and a [place] elevator. The proximal plaque was cut in the common carotid, and the internal carotid end was feathered to its natural termination. The plaque in the external carotid was inverted and removed.    The plaque was inspected demonstrating ruptured plaque and severe stenosis.    A 1 cm wide bovine pericardial carotid graft was soaked in saline and then sewn in place with a running 6-0 prolene suture. Continuous heparinized saline and Dextran were used during closure of the arterotomy. Once the graft was in place the external carotid, common and then the internal carotid artery clamps were removed.    Intraoperative Duplex demonstrated wide patency of the previously stenotic internal carotid artery.    The heparin was reversed.    The wound was closed in appropriate layers, the skin reaproximated with subcuticular Monocryl and Dermabond.    The patient was awakened from general endotracheal anesthesia, extubated, and taken to the recovery room in good condition.    Salvador Morales MD     Date: 10/9/2017  Time: 4:09 PM

## 2017-10-24 ENCOUNTER — HOSPITAL ENCOUNTER (OUTPATIENT)
Dept: CARDIOLOGY | Facility: HOSPITAL | Age: 72
Discharge: HOME | End: 2017-10-24
Admitting: NURSE PRACTITIONER

## 2017-10-24 DIAGNOSIS — I65.23 CAROTID STENOSIS, ASYMPTOMATIC, BILATERAL: ICD-10-CM

## 2017-10-24 LAB
BH CV XLRA MEAS LEFT DIST CCA EDV: -12.9 CM/SEC
BH CV XLRA MEAS LEFT DIST CCA PSV: -75 CM/SEC
BH CV XLRA MEAS LEFT DIST ICA EDV: -12.4 CM/SEC
BH CV XLRA MEAS LEFT DIST ICA PSV: -56.2 CM/SEC
BH CV XLRA MEAS LEFT MID ICA EDV: -15.3 CM/SEC
BH CV XLRA MEAS LEFT MID ICA PSV: -52.6 CM/SEC
BH CV XLRA MEAS LEFT PROX CCA EDV: 10.6 CM/SEC
BH CV XLRA MEAS LEFT PROX CCA PSV: 70.4 CM/SEC
BH CV XLRA MEAS LEFT PROX ECA EDV: -5 CM/SEC
BH CV XLRA MEAS LEFT PROX ECA PSV: -45.9 CM/SEC
BH CV XLRA MEAS LEFT PROX ICA EDV: -13.4 CM/SEC
BH CV XLRA MEAS LEFT PROX ICA PSV: -53.8 CM/SEC
BH CV XLRA MEAS LEFT PROX SCLA PSV: 95.9 CM/SEC
BH CV XLRA MEAS LEFT VERTEBRAL A EDV: 12.2 CM/SEC
BH CV XLRA MEAS LEFT VERTEBRAL A PSV: 44.8 CM/SEC
BH CV XLRA MEAS RIGHT DIST CCA EDV: -14.7 CM/SEC
BH CV XLRA MEAS RIGHT DIST CCA PSV: -82.1 CM/SEC
BH CV XLRA MEAS RIGHT DIST ICA EDV: -13 CM/SEC
BH CV XLRA MEAS RIGHT DIST ICA PSV: -37.5 CM/SEC
BH CV XLRA MEAS RIGHT MID ICA EDV: -12.3 CM/SEC
BH CV XLRA MEAS RIGHT MID ICA PSV: -46.3 CM/SEC
BH CV XLRA MEAS RIGHT PROX CCA EDV: 13.5 CM/SEC
BH CV XLRA MEAS RIGHT PROX CCA PSV: 82.7 CM/SEC
BH CV XLRA MEAS RIGHT PROX ECA EDV: -7.6 CM/SEC
BH CV XLRA MEAS RIGHT PROX ECA PSV: -75 CM/SEC
BH CV XLRA MEAS RIGHT PROX ICA EDV: -14.1 CM/SEC
BH CV XLRA MEAS RIGHT PROX ICA PSV: -69.8 CM/SEC
BH CV XLRA MEAS RIGHT PROX SCLA PSV: 81.5 CM/SEC
BH CV XLRA MEAS RIGHT VERTEBRAL A EDV: 5.5 CM/SEC
BH CV XLRA MEAS RIGHT VERTEBRAL A PSV: 30.2 CM/SEC
LEFT ARM BP: NORMAL MMHG
RIGHT ARM BP: NORMAL MMHG

## 2017-10-24 PROCEDURE — 93880 EXTRACRANIAL BILAT STUDY: CPT

## 2017-11-03 ENCOUNTER — OFFICE VISIT (OUTPATIENT)
Dept: NEUROSURGERY | Facility: CLINIC | Age: 72
End: 2017-11-03

## 2017-11-03 VITALS
HEART RATE: 78 BPM | DIASTOLIC BLOOD PRESSURE: 70 MMHG | BODY MASS INDEX: 30.31 KG/M2 | RESPIRATION RATE: 16 BRPM | HEIGHT: 68 IN | WEIGHT: 200 LBS | SYSTOLIC BLOOD PRESSURE: 122 MMHG

## 2017-11-03 DIAGNOSIS — Z86.79 HX OF CAROTID STENOSIS: ICD-10-CM

## 2017-11-03 DIAGNOSIS — I65.22 CAROTID STENOSIS, LEFT: Primary | ICD-10-CM

## 2017-11-03 DIAGNOSIS — I65.22 STENOSIS OF LEFT CAROTID ARTERY: ICD-10-CM

## 2017-11-03 PROCEDURE — 99024 POSTOP FOLLOW-UP VISIT: CPT | Performed by: NURSE PRACTITIONER

## 2017-11-03 RX ORDER — LIRAGLUTIDE 6 MG/ML
INJECTION SUBCUTANEOUS
Refills: 2 | COMMUNITY
Start: 2017-08-30

## 2017-11-03 RX ORDER — NAPROXEN SODIUM 220 MG
220 TABLET ORAL 2 TIMES DAILY PRN
COMMUNITY

## 2017-11-03 RX ORDER — CLOPIDOGREL BISULFATE 75 MG/1
75 TABLET ORAL DAILY
Qty: 30 TABLET | Refills: 0 | Status: SHIPPED | OUTPATIENT
Start: 2017-11-03 | End: 2018-08-21 | Stop reason: ALTCHOICE

## 2017-11-03 NOTE — PROGRESS NOTES
" HPI:   Ted Glover is a 72 y.o. male for follow-up of severe carotid stenosis status post left-sided carotid endarterectomy on September 27, 2017 with Dr. Morales.  He has undergone follow-up carotid Doppler October 24 for continued stenosis surveillance.    He presented to the hospital with strokelike symptoms and CTA of the neck revealed 90% left-sided stenosis.  Fortunately, his symptoms have resolved which primarily included left eye visual deficits.  He underwent left-sided carotid endarterectomy surgery with Dr. Morales and did very well postop.  He has had no new strokelike symptoms.  He was started on Plavix postop and he continues on this medication.  Overall he is feeling and doing well.  He specifically denies any vision changes.  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     He presents accompanied by his spouse.       Review of Systems   Constitutional: Negative for chills and fever.   HENT: Positive for trouble swallowing. Negative for facial swelling.    Eyes: Negative for visual disturbance.   Musculoskeletal: Negative for gait problem.   Skin: Negative for wound.   Neurological: Positive for tremors, speech difficulty and numbness (LLE). Negative for dizziness, facial asymmetry, weakness, light-headedness and headaches.   Psychiatric/Behavioral: Negative for confusion and decreased concentration.        /70 (BP Location: Left arm, Patient Position: Sitting)  Pulse 78  Resp 16  Ht 68\" (172.7 cm)  Wt 200 lb (90.7 kg)  BMI 30.41 kg/m2    Physical Exam   Constitutional: He is oriented to person, place, and time. Vital signs are normal. He appears well-developed and well-nourished. He is cooperative.  Non-toxic appearance. He does not have a sickly appearance. He does not appear ill.   Very pleasant, well-appearing, older gentleman   HENT:   Head: Normocephalic and atraumatic.   Eyes: EOM are normal.   Neck: Neck supple. Carotid bruit is not present.   Well-healing left sided " incision site, normal surrounding skin, clean dry and intact, flat   Cardiovascular: Normal rate and intact distal pulses.    Pulmonary/Chest: Effort normal and breath sounds normal.   Abdominal: Soft.   Musculoskeletal: Normal range of motion. He exhibits no tenderness.   Strength equal throughout   Neurological: He is alert and oriented to person, place, and time. He has normal strength. He displays no atrophy and no tremor. No cranial nerve deficit or sensory deficit. He exhibits normal muscle tone. Coordination and gait normal. GCS eye subscore is 4. GCS verbal subscore is 5. GCS motor subscore is 6.   Cranial nerves II through XII grossly intact, specifically extraocular movements and visual fields full to confrontation bilaterally   Skin: Skin is warm and dry.   Psychiatric: He has a normal mood and affect.   Vitals reviewed.    Neurologic Exam     Mental Status   Oriented to person, place, and time.     Cranial Nerves     CN III, IV, VI   Extraocular motions are normal.     Motor Exam     Strength   Strength 5/5 throughout.       Findings/Results:  Bilateral carotid Doppler study reveals stable findings with very minimal plaque noted on the left and 16-49% stenosis on the right in the ICA proximally.      Assessment/Plan:  Ted was seen today for post-op.    Diagnoses and all orders for this visit:    Carotid stenosis, left  -     Duplex Carotid Ultrasound CAR; Future    Stenosis of left carotid artery, left 90 % on MRA neck    Hx of carotid stenosis    Other orders  -     clopidogrel (PLAVIX) 75 MG tablet; Take 1 tablet by mouth Daily.        Discussion/Summary  He returns the office today for first hospital follow-up status post left carotid endarterectomy for severe left ICA stenosis.  Exam as noted above, no red flags.  His left sided incision site has healed well and he has no residual sutures or glue.  He also denies any strokelike symptoms since his hospitalization.  He remains on the Plavix but I  did give him a refill of this medication as he is currently out until he can be seen by his primary care provider in 2 weeks.  Carotid Doppler study reveals no plaque in the left internal carotid artery postop.  He does have mild right-sided carotid stenosis.  We will continue to monitor him with repeat carotid ultrasound imaging and we'll have him return to the office in 6 months.  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  This will be followed up in 1 year.  He is doing very well and we will have her return the office in 6 months for follow-up.    Plan: Repeat carotid Doppler study in 6 months  Plan: Return in about 6 months (around 5/3/2018).         Patient Care Team    Patient Care Team:  Abad Archuleta DO as PCP - General (Family Medicine)    Rand Pruett, APRN  11/3/2017    Dragon disclaimer:   Much of this encounter note is an electronic transcription/translation of spoken language to printed text. The electronic translation of spoken language may permit erroneous, or at times, nonsensical words or phrases to be inadvertently transcribed; Although I have reviewed the note for such errors, some may still exist.

## 2017-12-01 RX ORDER — LIRAGLUTIDE 6 MG/ML
INJECTION SUBCUTANEOUS
Refills: 2 | OUTPATIENT
Start: 2017-12-01

## 2017-12-01 NOTE — TELEPHONE ENCOUNTER
This needs to be addressed by his PCP or whoever manages his diabetes. It probably was a refill from when he was DC'd from hospital

## 2018-05-08 ENCOUNTER — HOSPITAL ENCOUNTER (OUTPATIENT)
Dept: CARDIOLOGY | Facility: HOSPITAL | Age: 73
Discharge: HOME | End: 2018-05-08
Admitting: NURSE PRACTITIONER

## 2018-05-08 DIAGNOSIS — I65.22 CAROTID STENOSIS, LEFT: ICD-10-CM

## 2018-05-08 LAB
BH CV XLRA MEAS LEFT DIST CCA EDV: -15.7 CM/SEC
BH CV XLRA MEAS LEFT DIST CCA PSV: -79.4 CM/SEC
BH CV XLRA MEAS LEFT DIST ICA EDV: -17.7 CM/SEC
BH CV XLRA MEAS LEFT DIST ICA PSV: -70.8 CM/SEC
BH CV XLRA MEAS LEFT MID ICA EDV: -12.6 CM/SEC
BH CV XLRA MEAS LEFT MID ICA PSV: -54.5 CM/SEC
BH CV XLRA MEAS LEFT PROX CCA EDV: 13.4 CM/SEC
BH CV XLRA MEAS LEFT PROX CCA PSV: 88.9 CM/SEC
BH CV XLRA MEAS LEFT PROX ECA EDV: -12.6 CM/SEC
BH CV XLRA MEAS LEFT PROX ECA PSV: -121 CM/SEC
BH CV XLRA MEAS LEFT PROX ICA EDV: -9.4 CM/SEC
BH CV XLRA MEAS LEFT PROX ICA PSV: -78.6 CM/SEC
BH CV XLRA MEAS LEFT PROX SCLA PSV: 141 CM/SEC
BH CV XLRA MEAS LEFT VERTEBRAL A EDV: 10.7 CM/SEC
BH CV XLRA MEAS LEFT VERTEBRAL A PSV: 42.8 CM/SEC
BH CV XLRA MEAS RIGHT DIST CCA EDV: -16.4 CM/SEC
BH CV XLRA MEAS RIGHT DIST CCA PSV: -85 CM/SEC
BH CV XLRA MEAS RIGHT DIST ICA EDV: -18.3 CM/SEC
BH CV XLRA MEAS RIGHT DIST ICA PSV: -67.6 CM/SEC
BH CV XLRA MEAS RIGHT MID ICA EDV: -14.7 CM/SEC
BH CV XLRA MEAS RIGHT MID ICA PSV: -62.1 CM/SEC
BH CV XLRA MEAS RIGHT PROX CCA EDV: 15.2 CM/SEC
BH CV XLRA MEAS RIGHT PROX CCA PSV: 79.2 CM/SEC
BH CV XLRA MEAS RIGHT PROX ECA EDV: -12.3 CM/SEC
BH CV XLRA MEAS RIGHT PROX ECA PSV: -119 CM/SEC
BH CV XLRA MEAS RIGHT PROX ICA EDV: -15.8 CM/SEC
BH CV XLRA MEAS RIGHT PROX ICA PSV: -72.7 CM/SEC
BH CV XLRA MEAS RIGHT PROX SCLA PSV: 89.7 CM/SEC
BH CV XLRA MEAS RIGHT VERTEBRAL A EDV: 6.7 CM/SEC
BH CV XLRA MEAS RIGHT VERTEBRAL A PSV: 35.4 CM/SEC
LEFT ARM BP: NORMAL MMHG
RIGHT ARM BP: NORMAL MMHG

## 2018-05-08 PROCEDURE — 93880 EXTRACRANIAL BILAT STUDY: CPT

## 2018-05-16 ENCOUNTER — OFFICE VISIT (OUTPATIENT)
Dept: NEUROSURGERY | Facility: CLINIC | Age: 73
End: 2018-05-16

## 2018-05-16 VITALS
SYSTOLIC BLOOD PRESSURE: 146 MMHG | RESPIRATION RATE: 16 BRPM | WEIGHT: 206 LBS | BODY MASS INDEX: 31.32 KG/M2 | DIASTOLIC BLOOD PRESSURE: 76 MMHG | HEART RATE: 60 BPM

## 2018-05-16 DIAGNOSIS — I67.1 CEREBRAL ANEURYSM, NONRUPTURED: ICD-10-CM

## 2018-05-16 DIAGNOSIS — Z86.79 HX OF CAROTID STENOSIS: Primary | ICD-10-CM

## 2018-05-16 DIAGNOSIS — I65.22 SYMPTOMATIC STENOSIS OF LEFT CAROTID ARTERY WITHOUT INFARCTION: ICD-10-CM

## 2018-05-16 DIAGNOSIS — I65.22 STENOSIS OF LEFT CAROTID ARTERY: ICD-10-CM

## 2018-05-16 DIAGNOSIS — I65.22 OCCLUSION AND STENOSIS OF LEFT CAROTID ARTERY: ICD-10-CM

## 2018-05-16 PROCEDURE — 99214 OFFICE O/P EST MOD 30 MIN: CPT | Performed by: NURSE PRACTITIONER

## 2018-05-16 RX ORDER — PANTOPRAZOLE SODIUM 40 MG/1
40 TABLET, DELAYED RELEASE ORAL DAILY
COMMUNITY
Start: 2018-03-27

## 2018-05-16 NOTE — PROGRESS NOTES
Subjective   Patient ID: Ted Glover is a 72 y.o. male is here today for follow-up of carotid stenosis s/p (L) CEA 9-28-17. He has undergone repeat carotid doppler and is accompanied by his wife .    History of Present Illness     He returns to the office today for ongoing follow-up with history of a left-sided carotid endarterectomy on September 27, 2017.  He has undergone bilateral carotid Doppler imaging for continued surveillance.  He was started on Plavix postop and continues on the medication.    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  He is feeling very well.  He states he's getting off the Plavix following today's office visit due to the multiple bruises.  He denies any new problems.    He presents with his wife.      /76 (BP Location: Right arm, Patient Position: Sitting, Cuff Size: Adult)   Pulse 60   Resp 16   Wt 93.4 kg (206 lb)   BMI 31.32 kg/m²       The following portions of the patient's history were reviewed and updated as appropriate: allergies, current medications, past family history, past medical history, past social history, past surgical history and problem list.    Review of Systems   Musculoskeletal: Negative for gait problem.   Neurological: Negative for dizziness, speech difficulty, light-headedness and headaches.   Psychiatric/Behavioral: Negative for decreased concentration.       Objective   Physical Exam   Constitutional: He is oriented to person, place, and time. He appears well-developed and well-nourished. He is cooperative.   HENT:   Head: Normocephalic and atraumatic.   Eyes: EOM are normal.   Neck: Neck supple. Carotid bruit is not present. No tracheal deviation present.   Pulmonary/Chest: Effort normal.   Musculoskeletal: Normal range of motion.   Moves all extremities well   Neurological: He is alert and oriented to person, place, and time. He has normal strength. He  displays no tremor. No cranial nerve deficit. Coordination and gait normal. GCS eye subscore is 4. GCS verbal subscore is 5. GCS motor subscore is 6.   Stable upright gait     Skin: Skin is warm and dry.   Psychiatric: He has a normal mood and affect. His behavior is normal. Judgment and thought content normal.   Vitals reviewed.    Neurologic Exam     Mental Status   Oriented to person, place, and time.     Cranial Nerves     CN III, IV, VI   Extraocular motions are normal.     Motor Exam     Strength   Strength 5/5 throughout.       Assessment/Plan   Independent Review of Radiographic Studies:    Carotid Doppler ultrasound dated May 18, 2018 reveals plaque noted in the right internal carotid artery proximally without significant stenosis and proximal left internal carotid artery is a normal      Medical Decision Making:    He returns to the office today for 6 month follow-up with carotid ultrasound imaging for continued surveillance with history of severe stenosis.  Exam as noted above, no red flags.  Carotid ultrasound reveals mild plaque in the right internal carotid artery proximally without significant stenosis in the proximal left internal carotid artery is normal.  He is feeling great and denies any neuro changes.  He is stable on exam.  We will have her return to the office in 1 year with repeat carotid ultrasound.    There was an incidental finding seen on MRA head imaging ordered while in the hospital of a very small 0.3 mm aneurysm off of the anterior communicating artery.  We will also check this at his next appointment with repeat MRA head imaging.  I did discuss aneurysm risk of rupture as well as signs and symptoms of rupture.  He expressed understanding.  He states that he is getting off of the Plavix as he no longer wants to worry about bruising.  He is no longer following cardiology given the stable cardiac exam and workup he had during the hospital.  From our standpoint, he does not need to be on  Plavix.    Plan: Return to office in 1 year with carotid Doppler and MRA head  Jean was seen today for carotid stenosis.    Diagnoses and all orders for this visit:    Hx of carotid stenosis  -     Duplex Carotid Ultrasound CAR; Future    Symptomatic stenosis of left carotid artery without infarction    Stenosis of left carotid artery, left 90 % on MRA neck    Occlusion and stenosis of left carotid artery   -     Duplex Carotid Ultrasound CAR; Future    Cerebral aneurysm, nonruptured  -     MRI Angiogram Head Without Contrast; Future      Return in about 1 year (around 5/16/2019).

## 2018-08-21 ENCOUNTER — OFFICE VISIT (OUTPATIENT)
Dept: CARDIOLOGY | Facility: CLINIC | Age: 73
End: 2018-08-21

## 2018-08-21 VITALS
BODY MASS INDEX: 30.92 KG/M2 | HEART RATE: 76 BPM | WEIGHT: 204 LBS | HEIGHT: 68 IN | DIASTOLIC BLOOD PRESSURE: 84 MMHG | SYSTOLIC BLOOD PRESSURE: 150 MMHG

## 2018-08-21 DIAGNOSIS — I10 HYPERTENSION, ESSENTIAL: ICD-10-CM

## 2018-08-21 DIAGNOSIS — I10 ESSENTIAL HYPERTENSION: ICD-10-CM

## 2018-08-21 DIAGNOSIS — I25.10 ATHEROSCLEROSIS OF NATIVE CORONARY ARTERY OF NATIVE HEART WITHOUT ANGINA PECTORIS: ICD-10-CM

## 2018-08-21 DIAGNOSIS — Z95.5 HX OF HEART ARTERY STENT: ICD-10-CM

## 2018-08-21 DIAGNOSIS — I25.10 CORONARY ARTERY DISEASE INVOLVING NATIVE HEART WITHOUT ANGINA PECTORIS, UNSPECIFIED VESSEL OR LESION TYPE: Primary | ICD-10-CM

## 2018-08-21 PROCEDURE — 99203 OFFICE O/P NEW LOW 30 MIN: CPT | Performed by: INTERNAL MEDICINE

## 2018-08-23 PROCEDURE — 93000 ELECTROCARDIOGRAM COMPLETE: CPT | Performed by: INTERNAL MEDICINE

## 2018-09-10 ENCOUNTER — HOSPITAL ENCOUNTER (OUTPATIENT)
Dept: CARDIOLOGY | Facility: HOSPITAL | Age: 73
Discharge: HOME | End: 2018-09-10
Attending: INTERNAL MEDICINE

## 2018-09-10 ENCOUNTER — HOSPITAL ENCOUNTER (OUTPATIENT)
Dept: CARDIOLOGY | Facility: HOSPITAL | Age: 73
Discharge: HOME | End: 2018-09-10
Attending: INTERNAL MEDICINE | Admitting: INTERNAL MEDICINE

## 2018-09-10 VITALS
HEART RATE: 73 BPM | HEIGHT: 68 IN | WEIGHT: 204 LBS | OXYGEN SATURATION: 95 % | SYSTOLIC BLOOD PRESSURE: 136 MMHG | BODY MASS INDEX: 30.92 KG/M2 | DIASTOLIC BLOOD PRESSURE: 81 MMHG | RESPIRATION RATE: 18 BRPM

## 2018-09-10 PROCEDURE — 25010000002 REGADENOSON 0.4 MG/5ML SOLUTION: Performed by: INTERNAL MEDICINE

## 2018-09-10 PROCEDURE — 93018 CV STRESS TEST I&R ONLY: CPT | Performed by: INTERNAL MEDICINE

## 2018-09-10 PROCEDURE — 93016 CV STRESS TEST SUPVJ ONLY: CPT | Performed by: INTERNAL MEDICINE

## 2018-09-10 PROCEDURE — 78452 HT MUSCLE IMAGE SPECT MULT: CPT | Performed by: INTERNAL MEDICINE

## 2018-09-10 PROCEDURE — 93306 TTE W/DOPPLER COMPLETE: CPT

## 2018-09-10 PROCEDURE — 93017 CV STRESS TEST TRACING ONLY: CPT

## 2018-09-10 PROCEDURE — A9500 TC99M SESTAMIBI: HCPCS | Performed by: INTERNAL MEDICINE

## 2018-09-10 PROCEDURE — 0 TECHNETIUM SESTAMIBI: Performed by: INTERNAL MEDICINE

## 2018-09-10 PROCEDURE — 93306 TTE W/DOPPLER COMPLETE: CPT | Performed by: INTERNAL MEDICINE

## 2018-09-10 PROCEDURE — 78452 HT MUSCLE IMAGE SPECT MULT: CPT

## 2018-09-10 RX ADMIN — REGADENOSON 0.4 MG: 0.08 INJECTION, SOLUTION INTRAVENOUS at 10:27

## 2018-09-10 RX ADMIN — TECHNETIUM TC 99M SESTAMIBI 1 DOSE: 1 INJECTION INTRAVENOUS at 08:00

## 2018-09-10 RX ADMIN — TECHNETIUM TC 99M SESTAMIBI 1 DOSE: 1 INJECTION INTRAVENOUS at 10:26

## 2018-09-11 LAB
AORTIC DIMENSIONLESS INDEX: 0.7 (DI)
BH CV ECHO MEAS - ACS: 1.8 CM
BH CV ECHO MEAS - AI DEC SLOPE: 290 CM/SEC^2
BH CV ECHO MEAS - AI MAX PG: 66.3 MMHG
BH CV ECHO MEAS - AI MAX VEL: 407 CM/SEC
BH CV ECHO MEAS - AI P1/2T: 411.1 MSEC
BH CV ECHO MEAS - AO MAX PG (FULL): 5 MMHG
BH CV ECHO MEAS - AO MAX PG: 9.5 MMHG
BH CV ECHO MEAS - AO MEAN PG (FULL): 2 MMHG
BH CV ECHO MEAS - AO MEAN PG: 5 MMHG
BH CV ECHO MEAS - AO ROOT AREA (BSA CORRECTED): 1.7
BH CV ECHO MEAS - AO ROOT AREA: 9.6 CM^2
BH CV ECHO MEAS - AO ROOT DIAM: 3.5 CM
BH CV ECHO MEAS - AO V2 MAX: 154 CM/SEC
BH CV ECHO MEAS - AO V2 MEAN: 106 CM/SEC
BH CV ECHO MEAS - AO V2 VTI: 33.4 CM
BH CV ECHO MEAS - ASC AORTA: 3.7 CM
BH CV ECHO MEAS - AVA(I,A): 2.7 CM^2
BH CV ECHO MEAS - AVA(I,D): 2.7 CM^2
BH CV ECHO MEAS - AVA(V,A): 2.6 CM^2
BH CV ECHO MEAS - AVA(V,D): 2.6 CM^2
BH CV ECHO MEAS - BSA(HAYCOCK): 2.1 M^2
BH CV ECHO MEAS - BSA: 2.1 M^2
BH CV ECHO MEAS - BZI_BMI: 31 KILOGRAMS/M^2
BH CV ECHO MEAS - BZI_METRIC_HEIGHT: 172.7 CM
BH CV ECHO MEAS - BZI_METRIC_WEIGHT: 92.5 KG
BH CV ECHO MEAS - EDV(CUBED): 79.5 ML
BH CV ECHO MEAS - EDV(MOD-SP2): 64 ML
BH CV ECHO MEAS - EDV(MOD-SP4): 97 ML
BH CV ECHO MEAS - EDV(TEICH): 83.1 ML
BH CV ECHO MEAS - EF(CUBED): 62.5 %
BH CV ECHO MEAS - EF(MOD-BP): 61 %
BH CV ECHO MEAS - EF(MOD-SP2): 59.4 %
BH CV ECHO MEAS - EF(MOD-SP4): 62.9 %
BH CV ECHO MEAS - EF(TEICH): 54.4 %
BH CV ECHO MEAS - ESV(CUBED): 29.8 ML
BH CV ECHO MEAS - ESV(MOD-SP2): 26 ML
BH CV ECHO MEAS - ESV(MOD-SP4): 36 ML
BH CV ECHO MEAS - ESV(TEICH): 37.9 ML
BH CV ECHO MEAS - FS: 27.9 %
BH CV ECHO MEAS - IVS/LVPW: 1
BH CV ECHO MEAS - IVSD: 0.9 CM
BH CV ECHO MEAS - LAT PEAK E' VEL: 8 CM/SEC
BH CV ECHO MEAS - LV DIASTOLIC VOL/BSA (35-75): 47.1 ML/M^2
BH CV ECHO MEAS - LV MASS(C)D: 123.3 GRAMS
BH CV ECHO MEAS - LV MASS(C)DI: 59.8 GRAMS/M^2
BH CV ECHO MEAS - LV MAX PG: 4.5 MMHG
BH CV ECHO MEAS - LV MEAN PG: 3 MMHG
BH CV ECHO MEAS - LV SYSTOLIC VOL/BSA (12-30): 17.5 ML/M^2
BH CV ECHO MEAS - LV V1 MAX: 106 CM/SEC
BH CV ECHO MEAS - LV V1 MEAN: 75.7 CM/SEC
BH CV ECHO MEAS - LV V1 VTI: 23.5 CM
BH CV ECHO MEAS - LVIDD: 4.3 CM
BH CV ECHO MEAS - LVIDS: 3.1 CM
BH CV ECHO MEAS - LVLD AP2: 8 CM
BH CV ECHO MEAS - LVLD AP4: 8.2 CM
BH CV ECHO MEAS - LVLS AP2: 6.4 CM
BH CV ECHO MEAS - LVLS AP4: 6.7 CM
BH CV ECHO MEAS - LVOT AREA (M): 3.8 CM^2
BH CV ECHO MEAS - LVOT AREA: 3.8 CM^2
BH CV ECHO MEAS - LVOT DIAM: 2.2 CM
BH CV ECHO MEAS - LVPWD: 0.9 CM
BH CV ECHO MEAS - MED PEAK E' VEL: 6 CM/SEC
BH CV ECHO MEAS - MV A DUR: 0.13 SEC
BH CV ECHO MEAS - MV A MAX VEL: 110 CM/SEC
BH CV ECHO MEAS - MV DEC SLOPE: 257 CM/SEC^2
BH CV ECHO MEAS - MV DEC TIME: 0.2 SEC
BH CV ECHO MEAS - MV E MAX VEL: 80 CM/SEC
BH CV ECHO MEAS - MV E/A: 0.73
BH CV ECHO MEAS - MV MAX PG: 5 MMHG
BH CV ECHO MEAS - MV MEAN PG: 2 MMHG
BH CV ECHO MEAS - MV P1/2T MAX VEL: 92.3 CM/SEC
BH CV ECHO MEAS - MV P1/2T: 105.2 MSEC
BH CV ECHO MEAS - MV V2 MAX: 112 CM/SEC
BH CV ECHO MEAS - MV V2 MEAN: 65.8 CM/SEC
BH CV ECHO MEAS - MV V2 VTI: 32.8 CM
BH CV ECHO MEAS - MVA P1/2T LCG: 2.4 CM^2
BH CV ECHO MEAS - MVA(P1/2T): 2.1 CM^2
BH CV ECHO MEAS - MVA(VTI): 2.7 CM^2
BH CV ECHO MEAS - PA ACC TIME: 0.15 SEC
BH CV ECHO MEAS - PA MAX PG (FULL): 2.3 MMHG
BH CV ECHO MEAS - PA MAX PG: 4.2 MMHG
BH CV ECHO MEAS - PA PR(ACCEL): 11.1 MMHG
BH CV ECHO MEAS - PA V2 MAX: 103 CM/SEC
BH CV ECHO MEAS - PULM A REVS DUR: 0.12 SEC
BH CV ECHO MEAS - PULM A REVS VEL: 29.7 CM/SEC
BH CV ECHO MEAS - PULM DIAS VEL: 41 CM/SEC
BH CV ECHO MEAS - PULM S/D: 1.4
BH CV ECHO MEAS - PULM SYS VEL: 55.4 CM/SEC
BH CV ECHO MEAS - PVA(V,A): 2.8 CM^2
BH CV ECHO MEAS - PVA(V,D): 2.8 CM^2
BH CV ECHO MEAS - QP/QS: 0.73
BH CV ECHO MEAS - RAP SYSTOLE: 8 MMHG
BH CV ECHO MEAS - RV MAX PG: 1.9 MMHG
BH CV ECHO MEAS - RV MEAN PG: 1 MMHG
BH CV ECHO MEAS - RV V1 MAX: 69.8 CM/SEC
BH CV ECHO MEAS - RV V1 MEAN: 55.3 CM/SEC
BH CV ECHO MEAS - RV V1 VTI: 15.6 CM
BH CV ECHO MEAS - RVOT AREA: 4.2 CM^2
BH CV ECHO MEAS - RVOT DIAM: 2.3 CM
BH CV ECHO MEAS - RVSP: 31 MMHG
BH CV ECHO MEAS - SI(AO): 155.9 ML/M^2
BH CV ECHO MEAS - SI(CUBED): 24.1 ML/M^2
BH CV ECHO MEAS - SI(LVOT): 43.3 ML/M^2
BH CV ECHO MEAS - SI(MOD-SP2): 18.4 ML/M^2
BH CV ECHO MEAS - SI(MOD-SP4): 29.6 ML/M^2
BH CV ECHO MEAS - SI(TEICH): 21.9 ML/M^2
BH CV ECHO MEAS - SV(AO): 321.3 ML
BH CV ECHO MEAS - SV(CUBED): 49.7 ML
BH CV ECHO MEAS - SV(LVOT): 89.3 ML
BH CV ECHO MEAS - SV(MOD-SP2): 38 ML
BH CV ECHO MEAS - SV(MOD-SP4): 61 ML
BH CV ECHO MEAS - SV(RVOT): 64.8 ML
BH CV ECHO MEAS - SV(TEICH): 45.2 ML
BH CV ECHO MEAS - TAPSE (>1.6): 1.8 CM2
BH CV ECHO MEAS - TR MAX VEL: 238 CM/SEC
BH CV ECHO MEASUREMENTS AVERAGE E/E' RATIO: 11.43
BH CV STRESS BP STAGE 1: NORMAL
BH CV STRESS BP STAGE 2: NORMAL
BH CV STRESS BP STAGE 3: NORMAL
BH CV STRESS DURATION MIN STAGE 1: 3
BH CV STRESS DURATION MIN STAGE 2: 2
BH CV STRESS DURATION MIN STAGE 3: 1
BH CV STRESS DURATION SEC STAGE 1: 0
BH CV STRESS DURATION SEC STAGE 2: 50
BH CV STRESS DURATION SEC STAGE 3: 40
BH CV STRESS GRADE STAGE 1: 0
BH CV STRESS GRADE STAGE 2: 10
BH CV STRESS GRADE STAGE 3: 10
BH CV STRESS HR STAGE 1: 96
BH CV STRESS HR STAGE 2: 100
BH CV STRESS HR STAGE 3: 111
BH CV STRESS METS STAGE 1: 2.3
BH CV STRESS METS STAGE 2: 4.6
BH CV STRESS METS STAGE 3: 7
BH CV STRESS PROTOCOL 1: NORMAL
BH CV STRESS PROTOCOL 2 BP STAGE 1: NORMAL
BH CV STRESS PROTOCOL 2 COMMENTS STAGE 1: NORMAL
BH CV STRESS PROTOCOL 2 DOSE REGADENOSON STAGE 1: 0.4
BH CV STRESS PROTOCOL 2 DURATION MIN STAGE 1: 1
BH CV STRESS PROTOCOL 2 DURATION SEC STAGE 1: 25
BH CV STRESS PROTOCOL 2 HR STAGE 1: 110
BH CV STRESS PROTOCOL 2 STAGE 1: 1
BH CV STRESS PROTOCOL 2: NORMAL
BH CV STRESS RECOVERY BP: NORMAL MMHG
BH CV STRESS RECOVERY HR: 89 BPM
BH CV STRESS RECOVERY O2: 98 %
BH CV STRESS SPEED STAGE 1: 1.7
BH CV STRESS SPEED STAGE 2: 1.7
BH CV STRESS SPEED STAGE 3: 2.5
BH CV STRESS STAGE 1: 0
BH CV STRESS STAGE 2: 1
BH CV STRESS STAGE 3: 2
BH CV VAS BP RIGHT ARM: NORMAL MMHG
BH CV XLRA - RV BASE: 2.6 CM
BH CV XLRA - TDI S': 12 CM/SEC
LEFT ATRIUM VOLUME INDEX: 20.3 ML/M2
LV EF NUC BP: 60 %
MAXIMAL PREDICTED HEART RATE: 147 BPM
MAXIMAL PREDICTED HEART RATE: 147 BPM
PERCENT MAX PREDICTED HR: 75.51 %
STRESS BASELINE BP: NORMAL MMHG
STRESS BASELINE HR: 75 BPM
STRESS O2 SAT REST: 95 %
STRESS PERCENT HR: 89 %
STRESS POST ESTIMATED WORKLOAD: 7 METS
STRESS POST EXERCISE DUR MIN: 9 MIN
STRESS POST EXERCISE DUR SEC: 25 SEC
STRESS POST PEAK BP: NORMAL MMHG
STRESS POST PEAK HR: 111 BPM
STRESS TARGET HR: 125 BPM
STRESS TARGET HR: 125 BPM

## 2018-10-02 ENCOUNTER — OFFICE VISIT (OUTPATIENT)
Dept: CARDIOLOGY | Facility: CLINIC | Age: 73
End: 2018-10-02

## 2018-10-02 VITALS
BODY MASS INDEX: 30.92 KG/M2 | HEART RATE: 72 BPM | DIASTOLIC BLOOD PRESSURE: 77 MMHG | SYSTOLIC BLOOD PRESSURE: 126 MMHG | HEIGHT: 68 IN | WEIGHT: 204 LBS

## 2018-10-02 DIAGNOSIS — I65.22 SYMPTOMATIC STENOSIS OF LEFT CAROTID ARTERY WITHOUT INFARCTION: ICD-10-CM

## 2018-10-02 DIAGNOSIS — I10 ESSENTIAL HYPERTENSION: Primary | ICD-10-CM

## 2018-10-02 DIAGNOSIS — I25.10 CORONARY ARTERY DISEASE INVOLVING NATIVE CORONARY ARTERY OF NATIVE HEART WITHOUT ANGINA PECTORIS: ICD-10-CM

## 2018-10-02 PROCEDURE — 99213 OFFICE O/P EST LOW 20 MIN: CPT | Performed by: INTERNAL MEDICINE

## 2018-10-02 RX ORDER — CLOPIDOGREL BISULFATE 75 MG/1
TABLET ORAL
COMMUNITY
Start: 2018-09-10 | End: 2018-10-02 | Stop reason: ALTCHOICE

## 2018-10-02 NOTE — PROGRESS NOTES
" Subjective:        Ted Glover is a 73 y.o. male who here for follow up    CC  The follow-up after the stress test echocardiogram  HPI  73 years old male with known history of the coronary artery disease as well as hypertension here for the follow-up    Ted Glover  here for follow up with no complaints of chest pain, sob, palpitation, syncope, near syncope  No side effects with current meds  No pnd, orthopnea       Problem List Items Addressed This Visit        Cardiovascular and Mediastinum    Hypertension - Primary    CAD (coronary artery disease), hx stent    Carotid stenosis, symptomatic w/o infarct        .    The following portions of the patient's history were reviewed and updated as appropriate: allergies, current medications, past family history, past medical history, past social history, past surgical history and problem list.    Past Medical History:   Diagnosis Date   • Aneurysm (CMS/HCC)    • Arthritis    • Bronchitis    • CAD (coronary artery disease), hx stent 9/26/2017   • COPD (chronic obstructive pulmonary disease) (CMS/[facility])    • Diabetes mellitus (CMS/[facility])    • Hypertension      reports that he quit smoking about 13 years ago. He has never used smokeless tobacco. He reports that he does not drink alcohol or use drugs.   Family History   Problem Relation Age of Onset   • Dementia Mother    • Heart disease Father    • Cancer Father    • Heart disease Brother    • Cancer Sister        Review of Systems  Constitutional: No wt loss, fever, fatigue  Gastrointestinal: No nausea, abdominal pain  Behavioral/Psych: No insomnia or anxiety   Cardiovascular no chest pains or tightness in chest  Objective:               Physical Exam  /77 (BP Location: Left arm, Patient Position: Sitting)   Pulse 72   Ht 172.7 cm (68\")   Wt 92.5 kg (204 lb)   BMI 31.02 kg/m²     General appearance: NAD, conversant   Eyes: anicteric sclerae, moist conjunctivae; no lid-lag; PERRLA   HENT: Atraumatic; " oropharynx clear with moist mucous membranes and no mucosal ulcerations;  normal hard and soft palate   Neck: Trachea midline; FROM, supple, no thyromegaly or lymphadenopathy   Lungs: CTA, with normal respiratory effort and no intercostal retractions   CV: S1-S2 regular, no murmurs, no rub, no gallop   Abdomen: Soft, non-tender; no masses or HSM   Extremities: No peripheral edema or extremity lymphadenopathy  Skin: Normal temperature, turgor and texture; no rash, ulcers or subcutaneous nodules   Psych: Appropriate affect, alert and oriented to person, place and time           Cardiographics  @Procedures    Echocardiogram:        Current Outpatient Prescriptions:   •  amLODIPine (NORVASC) 5 MG tablet, Take 5 mg by mouth Daily., Disp: , Rfl:   •  aspirin 81 MG tablet, Take 1 tablet by mouth Daily., Disp: 30 tablet, Rfl: 0  •  atorvastatin (LIPITOR) 80 MG tablet, Take 1 tablet by mouth Every Night., Disp: 30 tablet, Rfl: 0  •  GLIPIZIDE PO, Take 5 mg by mouth 2 (Two) Times a Day. unk dose / frequency , Disp: , Rfl:   •  metFORMIN (GLUCOPHAGE) 500 MG tablet, Take 1,000 mg by mouth 2 (Two) Times a Day With Meals., Disp: , Rfl:   •  metoprolol tartrate (LOPRESSOR) 50 MG tablet, Take 50 mg by mouth Daily., Disp: , Rfl:   •  montelukast (SINGULAIR) 10 MG tablet, Take 10 mg by mouth Every Night., Disp: , Rfl:   •  naproxen sodium (ALEVE) 220 MG tablet, Take 220 mg by mouth 2 (Two) Times a Day As Needed., Disp: , Rfl:   •  pantoprazole (PROTONIX) 40 MG EC tablet, Take 40 mg by mouth Daily., Disp: , Rfl:   •  VICTOZA 18 MG/3ML solution pen-injector injection, INJECT 1.8MG SUB-Q DAILY, Disp: , Rfl: 2   Assessment:        Patient Active Problem List   Diagnosis   • Amaurosis fugax, left eye x 3 episodes   • Arthritis   • COPD (chronic obstructive pulmonary disease) (CMS/HCC)   • Diabetes mellitus (CMS/HCC)   • Hypertension   • Stenosis of left carotid artery, left 90 % on MRA neck   • CAD (coronary artery disease), hx stent   •  Carotid stenosis, symptomatic w/o infarct   • Hx of carotid stenosis     Interpretation Summary        · Findings consistent with a normal ECG stress test.  · Diaphragmatic attenuation artifact is present.  · Left ventricular ejection fraction is normal (Calculated EF = 60%).  · Myocardial perfusion imaging indicates a normal myocardial perfusion study with no evidence of ischemia.  · Impressions are consistent with a low risk study.  · Thin apex appears as normal variant         Interpretation Summary     · Left atrial cavity size is borderline dilated.  · Right ventricular cavity is borderline dilated.  · There is calcification of the aortic valve.  · Left ventricular diastolic dysfunction (grade I) consistent with impaired relaxation.  · Calculated EF = 61%.  · There is no evidence of pericardial effusion.        Interpretation Summary     · Proximal right internal carotid artery plaque without significant stenosis.  · Proximal left internal carotid artery is normal.           Plan:            ICD-10-CM ICD-9-CM   1. Essential hypertension I10 401.9   2. Symptomatic stenosis of left carotid artery without infarction I65.22 433.10   3. Coronary artery disease involving native coronary artery of native heart without angina pectoris I25.10 414.01     1. Essential hypertension  The blood pressure under control    2. Symptomatic stenosis of left carotid artery without infarction  Asymptomatic    3. Coronary artery disease involving native coronary artery of native heart without angina pectoris  No angina pectoralis     CV STABLE    SEE IN 1 YR  COUNSELING:    Ted Simeon was given to patient for the following topics: diagnostic results, risk factor reductions, impressions, risks and benefits of treatment options and importance of treatment compliance .       SMOKING COUNSELING:    Counseling given: Yes      EMR Dragon/Transcription disclaimer:   Much of this encounter note is an electronic  transcription/translation of spoken language to printed text. The electronic translation of spoken language may permit erroneous, or at times, nonsensical words or phrases to be inadvertently transcribed; Although I have reviewed the note for such errors, some may still exist.

## 2018-10-08 ENCOUNTER — TRANSCRIBE ORDERS (OUTPATIENT)
Dept: ADMINISTRATIVE | Facility: HOSPITAL | Age: 73
End: 2018-10-08

## 2018-10-08 DIAGNOSIS — M54.5 LOW BACK PAIN, UNSPECIFIED BACK PAIN LATERALITY, UNSPECIFIED CHRONICITY, WITH SCIATICA PRESENCE UNSPECIFIED: Primary | ICD-10-CM

## 2018-10-08 DIAGNOSIS — M54.16 LUMBAR RADICULOPATHY: ICD-10-CM

## 2018-10-09 ENCOUNTER — HOSPITAL ENCOUNTER (OUTPATIENT)
Dept: GENERAL RADIOLOGY | Facility: HOSPITAL | Age: 73
Discharge: HOME | End: 2018-10-09

## 2018-10-09 ENCOUNTER — HOSPITAL ENCOUNTER (OUTPATIENT)
Dept: MRI IMAGING | Facility: HOSPITAL | Age: 73
Discharge: HOME | End: 2018-10-09
Admitting: NEUROLOGICAL SURGERY

## 2018-10-09 DIAGNOSIS — M54.16 LUMBAR RADICULOPATHY: ICD-10-CM

## 2018-10-09 DIAGNOSIS — M54.5 LOW BACK PAIN, UNSPECIFIED BACK PAIN LATERALITY, UNSPECIFIED CHRONICITY, WITH SCIATICA PRESENCE UNSPECIFIED: ICD-10-CM

## 2018-10-09 PROCEDURE — 72110 X-RAY EXAM L-2 SPINE 4/>VWS: CPT

## 2018-10-09 PROCEDURE — 72148 MRI LUMBAR SPINE W/O DYE: CPT

## 2019-05-15 ENCOUNTER — HOSPITAL ENCOUNTER (OUTPATIENT)
Dept: MRI IMAGING | Facility: HOSPITAL | Age: 74
Discharge: HOME | End: 2019-05-15
Admitting: NURSE PRACTITIONER

## 2019-05-15 ENCOUNTER — HOSPITAL ENCOUNTER (OUTPATIENT)
Dept: CARDIOLOGY | Facility: HOSPITAL | Age: 74
Discharge: HOME | End: 2019-05-15

## 2019-05-15 DIAGNOSIS — I65.22 OCCLUSION AND STENOSIS OF LEFT CAROTID ARTERY: ICD-10-CM

## 2019-05-15 DIAGNOSIS — Z86.79 HX OF CAROTID STENOSIS: ICD-10-CM

## 2019-05-15 DIAGNOSIS — I67.1 CEREBRAL ANEURYSM, NONRUPTURED: ICD-10-CM

## 2019-05-15 LAB
BH CV XLRA MEAS LEFT CCA RATIO VEL: 88 CM/SEC
BH CV XLRA MEAS LEFT DIST CCA EDV: 14.9 CM/SEC
BH CV XLRA MEAS LEFT DIST CCA PSV: 88 CM/SEC
BH CV XLRA MEAS LEFT DIST ICA EDV: -10.2 CM/SEC
BH CV XLRA MEAS LEFT DIST ICA PSV: -46.6 CM/SEC
BH CV XLRA MEAS LEFT ICA RATIO VEL: -55.8 CM/SEC
BH CV XLRA MEAS LEFT ICA/CCA RATIO: -0.63
BH CV XLRA MEAS LEFT MID ICA EDV: -10.6 CM/SEC
BH CV XLRA MEAS LEFT MID ICA PSV: -55.8 CM/SEC
BH CV XLRA MEAS LEFT PROX CCA EDV: 14.1 CM/SEC
BH CV XLRA MEAS LEFT PROX CCA PSV: 88 CM/SEC
BH CV XLRA MEAS LEFT PROX ECA EDV: -13.8 CM/SEC
BH CV XLRA MEAS LEFT PROX ECA PSV: -118 CM/SEC
BH CV XLRA MEAS LEFT PROX ICA EDV: -9.4 CM/SEC
BH CV XLRA MEAS LEFT PROX ICA PSV: -47.5 CM/SEC
BH CV XLRA MEAS LEFT PROX SCLA PSV: 220 CM/SEC
BH CV XLRA MEAS LEFT VERTEBRAL A EDV: -10.2 CM/SEC
BH CV XLRA MEAS LEFT VERTEBRAL A PSV: -39.7 CM/SEC
BH CV XLRA MEAS RIGHT CCA RATIO VEL: -119 CM/SEC
BH CV XLRA MEAS RIGHT DIST CCA EDV: -18.1 CM/SEC
BH CV XLRA MEAS RIGHT DIST CCA PSV: -119 CM/SEC
BH CV XLRA MEAS RIGHT DIST ICA EDV: -18.2 CM/SEC
BH CV XLRA MEAS RIGHT DIST ICA PSV: -78.6 CM/SEC
BH CV XLRA MEAS RIGHT ICA RATIO VEL: -78.6 CM/SEC
BH CV XLRA MEAS RIGHT ICA/CCA RATIO: 0.66
BH CV XLRA MEAS RIGHT MID ICA EDV: -14.7 CM/SEC
BH CV XLRA MEAS RIGHT MID ICA PSV: -62.1 CM/SEC
BH CV XLRA MEAS RIGHT PROX CCA EDV: 15.2 CM/SEC
BH CV XLRA MEAS RIGHT PROX CCA PSV: 92 CM/SEC
BH CV XLRA MEAS RIGHT PROX ECA EDV: -10.2 CM/SEC
BH CV XLRA MEAS RIGHT PROX ECA PSV: -118 CM/SEC
BH CV XLRA MEAS RIGHT PROX ICA EDV: -11.8 CM/SEC
BH CV XLRA MEAS RIGHT PROX ICA PSV: -57.8 CM/SEC
BH CV XLRA MEAS RIGHT PROX SCLA PSV: 89 CM/SEC
BH CV XLRA MEAS RIGHT VERTEBRAL A EDV: -5.5 CM/SEC
BH CV XLRA MEAS RIGHT VERTEBRAL A PSV: -35 CM/SEC
LEFT ARM BP: NORMAL MMHG
RIGHT ARM BP: NORMAL MMHG

## 2019-05-15 PROCEDURE — 70544 MR ANGIOGRAPHY HEAD W/O DYE: CPT

## 2019-05-15 PROCEDURE — 93880 EXTRACRANIAL BILAT STUDY: CPT

## 2019-05-29 ENCOUNTER — TELEPHONE (OUTPATIENT)
Dept: NEUROSURGERY | Facility: CLINIC | Age: 74
End: 2019-05-29

## 2019-05-29 ENCOUNTER — OFFICE VISIT (OUTPATIENT)
Dept: NEUROSURGERY | Facility: CLINIC | Age: 74
End: 2019-05-29

## 2019-05-29 VITALS
RESPIRATION RATE: 16 BRPM | DIASTOLIC BLOOD PRESSURE: 74 MMHG | BODY MASS INDEX: 30.77 KG/M2 | WEIGHT: 203 LBS | SYSTOLIC BLOOD PRESSURE: 146 MMHG | HEART RATE: 68 BPM | HEIGHT: 68 IN

## 2019-05-29 DIAGNOSIS — I65.23 SYMPTOMATIC STENOSIS OF BOTH CAROTID ARTERIES WITHOUT INFARCTION: Primary | ICD-10-CM

## 2019-05-29 PROCEDURE — 99214 OFFICE O/P EST MOD 30 MIN: CPT | Performed by: NURSE PRACTITIONER

## 2019-05-29 NOTE — PROGRESS NOTES
"Subjective   Patient ID: Ted Glover is a 73 y.o. male is here today for follow-up of 1Y carotid sten, doppler.  Pt is accompanied by his wife.    History of Present Illness    He returns to the office today for one-year follow-up with new carotid Doppler from [facility] dated May 15.  He has been followed closely since undergoing left carotid endarterectomy with Dr. Morales on September 20, 2017.  Plavix was discontinued at his last office visit 1 year ago.  He also has a very small aneurysm that was incidentally found and has undergone new MRA head imaging.    He denies any new problems today with the exception of some bilateral hand pain with numbness and tingling.  He states that he was diagnosed with carpal tunnel syndrome after seeing a hand specialist.  He is wearing a right wrist brace for this problem.  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  He denies any motor or sensory deficits of his extremities.  He also denies any strokelike symptoms.  He is doing and feeling very well.    He presents with his wife.    /74 (BP Location: Right arm, Patient Position: Sitting, Cuff Size: Adult)   Pulse 68   Resp 16   Ht 172.7 cm (68\")   Wt 92.1 kg (203 lb)   BMI 30.87 kg/m²      The following portions of the patient's history were reviewed and updated as appropriate: allergies, current medications, past family history, past medical history, past social history, past surgical history and problem list.    Review of Systems   HENT: Negative for ear pain and tinnitus.    Musculoskeletal: Negative for neck pain and neck stiffness.   Neurological: Negative for dizziness, syncope, light-headedness and headaches.       Objective   Physical Exam   Constitutional: He is oriented to person, place, and time. He appears well-developed and well-nourished. He is cooperative.  Non-toxic appearance. " He does not have a sickly appearance. He does not appear ill.   Very pleasant well-appearing older gentleman   HENT:   Head: Atraumatic.   Neck: Neck supple. No tracheal deviation present.   Pulmonary/Chest: Effort normal.   Musculoskeletal: Normal range of motion.   Moving all extremities well   Neurological: He is alert and oriented to person, place, and time. He has normal strength. He displays no tremor. No cranial nerve deficit or sensory deficit. He exhibits normal muscle tone. Coordination and gait normal. GCS eye subscore is 4. GCS verbal subscore is 5. GCS motor subscore is 6.   Gait is stable and upright  Cranial nerves II through XII grossly intact   Skin: Skin is warm and dry.   Psychiatric: He has a normal mood and affect. His behavior is normal. Thought content normal.   Vitals reviewed.    Neurologic Exam     Mental Status   Oriented to person, place, and time.     Motor Exam     Strength   Strength 5/5 throughout.       Assessment/Plan   Independent Review of Radiographic Studies:    Carotid ultrasound from [facility] dated May 15 reveals right and left internal carotid arteries with minimal plaque but without significant stenosis.    MRA head from [facility] dated May 15 reveals a small 3 x 2 mm anteriorly directed aneurysm of the anterior communicating artery that is unchanged when compared to MRI from September 2017.      Medical Decision Making:    He returns to the office today for one-year follow-up with new MRA head imaging and carotid ultrasound.  Exam as noted above, no red flags.  Rotted ultrasound revealed stable findings with only minimal plaque in both the right and left internal carotid arteries and without significant stenosis.  The MRA head shows stable findings with regards to age-related vascular changes in the very small 3 x 2 mm cerebral aneurysm.  Clinically he is doing very well.  He is stable on exam and denies any new problems.  I told him we  would be happy to see him for second opinion with regards to the bilateral carpal tunnel syndrome.  Otherwise, we will see him in 1 year with new carotid ultrasound for continued close surveillance.  They are to call at anytime with any questions or concerns.  We will follow-up in 3 years with new MRA head imaging for the small 3 x 2 mm aneurysm.    Plan: Return office in 1 year with carotid ultrasound, 3-year follow-up for the small cerebral aneurysm.  With MRI  Ted was seen today for 1y carotid sten, doppler.    Diagnoses and all orders for this visit:    Symptomatic stenosis of both carotid arteries without infarction  -     Duplex Carotid Ultrasound CAR; Future      Return in about 1 year (around 5/29/2020).

## 2019-05-29 NOTE — TELEPHONE ENCOUNTER
LVM for pt to give us his new medicare ID..    If pt did not get this number he can call medicare and get the new ID.    Per MW if pt does not provide it, he will have to put in as self pay until we get the new ID.

## 2020-03-04 ENCOUNTER — TELEPHONE (OUTPATIENT)
Dept: NEUROSURGERY | Facility: CLINIC | Age: 75
End: 2020-03-04

## 2020-07-09 ENCOUNTER — TELEPHONE (OUTPATIENT)
Dept: NEUROSURGERY | Facility: CLINIC | Age: 75
End: 2020-07-09

## 2020-07-09 NOTE — TELEPHONE ENCOUNTER
PT'S WIFE CALLED (AFTER RECEIVING CERTIFIED LETTER) STATING THAT THE PT WAS IN THE HOSP @ [facility] 06[facility]28-06/30) DUE TO MINI STROKES AND WAS SEEN BY NEUROLOGIST (DR ARSEN STOUT) AND PT HAD MRI, CT, EEG AND CAROTID DOPPLER WHILE HE WAS IN THE HOSP. PT IS FOLLOWING UP WITH THIS DR IN TWO WEEKS. PT'S WIFE IS WONDERING IF PT SHOULD WAIT UNTIL PT FOLLOWS UP WITH DR STOUT BEFORE RETURNING FOR AN APPT.     DR STOUT:  [phone]    REQUEST THAT IMAGING BE SENT TO [facility].    PT'S WIFE WOULD LIKE TO BE ADVISED.    PT OR WIFE CAN BE CONTACTED AT: [phone].    CALLED [facility] [employer] (PH: [phone] FAX: [phone]) FOR DR STOUT AND REQUESTED RECORDS TO BE FAXED.

## 2020-07-10 DIAGNOSIS — I65.23 SYMPTOMATIC STENOSIS OF BOTH CAROTID ARTERIES WITHOUT INFARCTION: ICD-10-CM

## 2020-07-15 NOTE — TELEPHONE ENCOUNTER
Scheduled appt with Dr. Marino for August 10 at 12:30 at [address] [address]. LM for patient to call me back. They will need to bring a disc from [facility] with recent MRA on it.

## 2020-08-07 NOTE — PROGRESS NOTES
Subjective   History of Present Illness: Ted Glover is a 74 y.o. male is here today for a 1Y follow-up of aneurysm.   MRA head done on 6/29/20.   Last seen on 5/29/19 and carotid u/s revealed  R&L internal carotid arteries with minimal plaque, but without significant stenosis. 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 Today Mr. Glover reports overall he feels well. He has some occasional N/T bilateral in his wrist which he r/t carpal tunnel and some occasional swelling in left leg r/t previous back surgery. He denies dizziness, light-headedness, headaches, blurred vision, issues with balance or gait.  He reports that he had a recent episode on 6/28/2020 where he had difficulty with speech.  He reports that he was able to talk but had trouble getting his words out.  He reports that this issue since resolved and his neurologist started him on Plavix.  He denies any current speech issues denies any numbness or weakness.    History of Present Illness    The following portions of the patient's history were reviewed and updated as appropriate: allergies, current medications, past medical history, past social history, past surgical history and problem list.    Past Medical History:   Diagnosis Date   • Aneurysm (CMS/[facility])    • Arthritis    • Bronchitis    • CAD (coronary artery disease), hx stent 9/26/2017   • Carpal tunnel syndrome    • COPD (chronic obstructive pulmonary disease) (CMS/[facility])    • Diabetes mellitus (CMS/[facility])    • Hypertension         Past Surgical History:   Procedure Laterality Date   • APPENDECTOMY     • BACK SURGERY      x4   • CARDIAC SURGERY     • CORONARY ANGIOPLASTY WITH STENT PLACEMENT     • EYE SURGERY      cataracts   • HERNIA REPAIR     • [place] THROMBOENDARTECTMY NECK,NECK INCIS Left 9/28/2017    Procedure: CAROTID ENDARTERECTOMY;  Surgeon: Salvador Morales MD;  Location: [facility]  Service:  Neurosurgery          Current Outpatient Medications:   •  amLODIPine (NORVASC) 5 MG tablet, Take 5 mg by mouth Daily., Disp: , Rfl:   •  aspirin 81 MG EC tablet, Take 81 mg by mouth Daily., Disp: , Rfl:   •  atorvastatin (LIPITOR) 80 MG tablet, Take 1 tablet by mouth Every Night., Disp: 30 tablet, Rfl: 0  •  clopidogrel (PLAVIX) 75 MG tablet, Take 75 mg by mouth Daily., Disp: , Rfl:   •  GLIPIZIDE PO, Take 5 mg by mouth 2 (Two) Times a Day. 1 tab in AM; 2 tabs in PM, Disp: , Rfl:   •  metFORMIN (GLUCOPHAGE) 500 MG tablet, Take 1,000 mg by mouth 2 (Two) Times a Day With Meals., Disp: , Rfl:   •  metoprolol tartrate (LOPRESSOR) 50 MG tablet, Take 50 mg by mouth Daily., Disp: , Rfl:   •  montelukast (SINGULAIR) 10 MG tablet, Take 10 mg by mouth Every Night., Disp: , Rfl:   •  pantoprazole (PROTONIX) 40 MG EC tablet, Take 40 mg by mouth Daily., Disp: , Rfl:   •  VICTOZA 18 MG/3ML solution pen-injector injection, INJECT 1.8MG SUB-Q DAILY, Disp: , Rfl: 2  •  naproxen sodium (ALEVE) 220 MG tablet, Take 220 mg by mouth 2 (Two) Times a Day As Needed., Disp: , Rfl:      Social History     Socioeconomic History   • Marital status:      Spouse name: Not on file   • Number of children: Not on file   • Years of education: Not on file   • Highest education level: Not on file   Occupational History   • 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   HENT:  "Positive for hearing loss (Bilateral; wears hearing aids). Negative for ear pain.    Eyes: Negative for visual disturbance.   Respiratory: Negative for cough and shortness of breath.    Cardiovascular: Positive for leg swelling (left leg r/t back surgery). Negative for chest pain and palpitations.   Gastrointestinal: Negative for abdominal pain, constipation and diarrhea.   Genitourinary: Negative for difficulty urinating and enuresis.   Musculoskeletal: Negative for back pain and gait problem.   Neurological: Positive for numbness (bilateral in wrist r/t carpal tunnel). Negative for dizziness, light-headedness and headaches.   Psychiatric/Behavioral: Negative for confusion and decreased concentration.       Objective     Vitals:    08/10/20 1231   BP: 130/58   Pulse: 74   Temp: 97.3 °F (36.3 °C)   Weight: 93.4 kg (206 lb)   Height: 171.5 cm (67.5\")     Body mass index is 31.79 kg/m².      Physical Exam   Constitutional: He is oriented to person, place, and time.   Eyes: Pupils are equal, round, and reactive to light. EOM are normal.   Neurological: He is oriented to person, place, and time.   Psychiatric: His speech is normal.     Neurologic Exam     Mental Status   Oriented to person, place, and time.   Attention: normal. Concentration: normal.   Speech: speech is normal     Cranial Nerves     CN II   Visual acuity: normal  Right visual field deficit: none  Left visual field deficit: none     CN III, IV, VI   Pupils are equal, round, and reactive to light.  Extraocular motions are normal.   CN III: no CN III palsy  CN VI: no CN VI palsy  Nystagmus: none   Diplopia: none  Upgaze: normal  Downgaze: normal    CN V   Facial sensation intact.     CN VII   Facial expression full, symmetric.     CN VIII   CN VIII normal.     CN XII   CN XII normal.     Motor Exam   Muscle bulk: normal    Strength   Right deltoid: 5/5  Left deltoid: 5/5  Right biceps: 5/5  Left biceps: 5/5  Right triceps: 5/5  Left triceps: 5/5  Right " wrist flexion: 5/5  Left wrist flexion: 5/5  Right wrist extension: 5/5  Left wrist extension: 5/5  Right interossei: 5/5  Left interossei: 5/5  Right iliopsoas: 5/5  Left iliopsoas: 5/5  Right quadriceps: 5/5  Left quadriceps: 5/5  Right hamstrin/5  Left hamstrin/5  Right anterior tibial: 5/5  Left anterior tibial: 5/5  Right posterior tibial: 5/5  Left posterior tibial: 5/5  Right peroneal: 5/5  Left peroneal: 5/5  Right gastroc: 5/5  Left gastroc: 5/5    Sensory Exam   Light touch normal.           Assessment/Plan   Independent Review of Radiographic Studies:      I personally reviewed the images from the following studies.    MRA of a head, MRI brain, CT head  The above-mentioned studies were reviewed and the MRA demonstrates a small 3 mm anterior communicating aneurysm.  His CT head demonstrates no evidence MRI brain demonstrates a large vessel stroke.  No evidence of intracranial mass.      Medical Decision Makin-year-old male with history of left CEA in 2017 as well as 3 mm anterior communicating artery aneurysm with recent TIA with transient speech difficulty     -He is doing well today with no new complaints.  -Recommend continue aspirin 81 mg and Plavix 75 mg daily  -Recommend CT angiogram neck and head to be completed at [facility]  -I will review these images when they are available and schedule a telephone visit with the patient to discuss the results.  If he has a left-sided large ulcerated plaque he may require stenting and angioplasty versus reoperative CEA versus TCAR  -Recommend follow-up with primary care physician for optimization of blood glucose, maintain LDL less than 70, maintain systolic blood pressure less than 135  -He was told to call 911 if he develops any strokelike episodes with speech difficulty or sudden weakness or severe sudden onset headaches.  -His risk of aneurysm rupture is less than 1 %/year, he does not smoke, blood pressure is well controlled,  and has no family history of aneurysms.  We will continue to monitor aneurysm with yearly MRA brain.  -I have also recommended that he stay hydrated and try to drink more water than Gatorade to avoid elevations in his blood sugar  -I have also ordered a CTA of his head to evaluate for any aneurysm growth in 1 year    Jean was seen today for aneurysm of the anterior communication artery.    Diagnoses and all orders for this visit:    Stenosis of both internal carotid arteries  -     CT Angiogram Head With & Without Contrast; Future  -     CT Angiogram Neck With & Without Contrast; Future  -     CT Angiogram Carotids; Future    Cerebral aneurysm, nonruptured  -     CT Angiogram Head With & Without Contrast; Future      Return follow up with phone conversation after CTA and then again in 1 year.

## 2020-08-10 ENCOUNTER — OFFICE VISIT (OUTPATIENT)
Dept: NEUROSURGERY | Facility: CLINIC | Age: 75
End: 2020-08-10

## 2020-08-10 VITALS
WEIGHT: 206 LBS | HEART RATE: 74 BPM | HEIGHT: 68 IN | BODY MASS INDEX: 31.22 KG/M2 | TEMPERATURE: 97.3 F | DIASTOLIC BLOOD PRESSURE: 58 MMHG | SYSTOLIC BLOOD PRESSURE: 130 MMHG

## 2020-08-10 DIAGNOSIS — I67.1 CEREBRAL ANEURYSM, NONRUPTURED: ICD-10-CM

## 2020-08-10 DIAGNOSIS — I65.23 STENOSIS OF BOTH INTERNAL CAROTID ARTERIES: Primary | ICD-10-CM

## 2020-08-10 PROCEDURE — 99214 OFFICE O/P EST MOD 30 MIN: CPT | Performed by: NEUROLOGICAL SURGERY

## 2020-08-10 RX ORDER — CLOPIDOGREL BISULFATE 75 MG/1
75 TABLET ORAL DAILY
COMMUNITY

## 2020-08-10 RX ORDER — ASPIRIN 81 MG/1
81 TABLET ORAL DAILY
COMMUNITY

## 2020-08-14 ENCOUNTER — HOSPITAL ENCOUNTER (OUTPATIENT)
Dept: CT IMAGING | Facility: HOSPITAL | Age: 75
Discharge: HOME | End: 2020-08-14
Admitting: NEUROLOGICAL SURGERY

## 2020-08-14 DIAGNOSIS — I65.23 STENOSIS OF BOTH INTERNAL CAROTID ARTERIES: ICD-10-CM

## 2020-08-14 PROCEDURE — 70496 CT ANGIOGRAPHY HEAD: CPT

## 2020-08-14 PROCEDURE — 70498 CT ANGIOGRAPHY NECK: CPT

## 2020-08-14 PROCEDURE — 0 IOPAMIDOL PER 1 ML: Performed by: NEUROLOGICAL SURGERY

## 2020-08-14 RX ADMIN — IOPAMIDOL 100 ML: 755 INJECTION, SOLUTION INTRAVENOUS at 13:59

## 2020-08-24 ENCOUNTER — OFFICE VISIT (OUTPATIENT)
Dept: NEUROSURGERY | Facility: CLINIC | Age: 75
End: 2020-08-24

## 2020-08-24 DIAGNOSIS — I67.1 CEREBRAL ANEURYSM, NONRUPTURED: Primary | ICD-10-CM

## 2020-08-24 DIAGNOSIS — I65.23 SYMPTOMATIC STENOSIS OF BOTH CAROTID ARTERIES WITHOUT INFARCTION: ICD-10-CM

## 2020-08-24 PROCEDURE — 99442 PR PHYS/QHP TELEPHONE EVALUATION 11-20 MIN: CPT | Performed by: NEUROLOGICAL SURGERY

## 2020-08-24 NOTE — PROGRESS NOTES
Subjective   History of Present Illness: Ted Glover is a 75 y.o. male is here today for follow-up of CTA Head/Neck via telephone visit. Patient was last seen in the office 8/10/20 for 1 yr f/u of anterior communicating aneurysm. He had CTA Head/Neck at  8/14/20. Today Mr. Glover reports overall he feels well. He has some occasional numbness bilaterally in his wrist which he r/t carpal tunnel and some occasional swelling in left leg r/t previous back surgery. He denies dizziness, light-headedness, headaches, blurred vision, issues with balance or gait.  He denies any current issues with speech or concentration. He was seen by his Neurologist Dr. You on 8/17/20 at which time he d/c his ASA since patient is on Plavix.  The patient had left the house at the time when I talked over the telephone.  I only spoke with his wife.  She reports that he did not want to wait for me and that she would just pass any messages on to him and relay the information.  She denies any recent episodes of speech difficulty or confusion.  Denies any new headaches.     You have chosen to receive care through a telephone visit. Do you consent to use a telephone visit for your medical care today? Yes      History of Present Illness    The following portions of the patient's history were reviewed and updated as appropriate: current medications, past family history, past medical history, past social history, past surgical history and problem list.    Past Medical History:   Diagnosis Date   • Aneurysm (CMS/[facility])    • Arthritis    • Bronchitis    • CAD (coronary artery disease), hx stent 9/26/2017   • Carpal tunnel syndrome    • COPD (chronic obstructive pulmonary disease) (CMS/[facility])    • Diabetes mellitus (CMS/[facility])    • Hypertension         Past Surgical History:   Procedure Laterality Date   • APPENDECTOMY     • BACK SURGERY      x4   • CARDIAC SURGERY     • CORONARY ANGIOPLASTY WITH STENT PLACEMENT     • EYE SURGERY       cataracts   • HERNIA REPAIR     • [place] THROMBOENDARTECTMY NECK,NECK INCIS Left 9/28/2017    Procedure: CAROTID ENDARTERECTOMY;  Surgeon: Salvador Morales MD;  Location: [facility];  Service: Neurosurgery          Current Outpatient Medications:   •  amLODIPine (NORVASC) 5 MG tablet, Take 5 mg by mouth Daily., Disp: , Rfl:   •  atorvastatin (LIPITOR) 80 MG tablet, Take 1 tablet by mouth Every Night., Disp: 30 tablet, Rfl: 0  •  clopidogrel (PLAVIX) 75 MG tablet, Take 75 mg by mouth Daily., Disp: , Rfl:   •  GLIPIZIDE PO, Take 5 mg by mouth 2 (Two) Times a Day. 1 tab in AM; 2 tabs in PM, Disp: , Rfl:   •  metFORMIN (GLUCOPHAGE) 500 MG tablet, Take 1,000 mg by mouth 2 (Two) Times a Day With Meals., Disp: , Rfl:   •  metoprolol tartrate (LOPRESSOR) 50 MG tablet, Take 50 mg by mouth Daily., Disp: , Rfl:   •  montelukast (SINGULAIR) 10 MG tablet, Take 10 mg by mouth Every Night., Disp: , Rfl:   •  naproxen sodium (ALEVE) 220 MG tablet, Take 220 mg by mouth 2 (Two) Times a Day As Needed., Disp: , Rfl:   •  pantoprazole (PROTONIX) 40 MG EC tablet, Take 40 mg by mouth Daily., Disp: , Rfl:   •  VICTOZA 18 MG/3ML solution pen-injector injection, INJECT 1.8MG SUB-Q DAILY, Disp: , Rfl: 2  •  aspirin 81 MG EC tablet, Take 81 mg by mouth Daily., Disp: , Rfl:      Social History     Socioeconomic History   • Marital status:      Spouse name: Not on file   • Number of children: Not on file   • Years of education: Not on file   • Highest education level: Not on file   Occupational History   • 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   HENT: Negative for trouble swallowing.    Eyes: Negative for visual disturbance.   Respiratory: Negative for cough and shortness of breath.    Cardiovascular: Positive for leg swelling (left leg r/t back surgery).   Gastrointestinal: Negative for abdominal pain, constipation and diarrhea.   Genitourinary: Negative for difficulty urinating and frequency.   Musculoskeletal: Positive for back pain (x4 back surgery).   Neurological: Positive for numbness (bilaterally in wrist (carpal tunnel, arthritis)  ). Negative for dizziness, speech difficulty, weakness, light-headedness and headaches.   Psychiatric/Behavioral: Negative for confusion and decreased concentration.       Objective     There were no vitals filed for this visit.  There is no height or weight on file to calculate BMI.      Physical Exam  Neurologic Exam  No physical exam was obtained today as this was a telephone visit      Assessment/Plan   Independent Review of Radiographic Studies:      I personally reviewed the images from the following studies.  CTA of the head and neck.  CTA of his brain demonstrates a small anterior communicating artery aneurysm which is approximately 5 mm x 3 mm.  Grossly unchanged from previous imaging studies.  The left MCA narrowing appears to be less severe than expected from the previous MRA.  There is moderate right PCA stenosis which was also seen on the previous MRI brain .  There is no significant mild atherosclerotic disease.     medical Decision Makin-year-old male with history of left CEA in 2017 as well as 3 mm anterior communicating artery aneurysm with recent TIA with transient speech difficulty      -He is doing well today with no new complaints.  -He recently saw his neurologist who took him off of aspirin but is continuing Plavix  -I reviewed the CTA of his  head and neck.  The aneurysm appears stable.  There is no evidence of severe stenosis of the cervical carotid arteries.  No significant stenosis of his left middle cerebral artery.  There is no high-grade stenosis to explain his recent transient episode of language difficulty.   -He was told to call 911 if he develops any strokelike episodes with speech difficulty or sudden weakness or severe sudden onset headaches.  -His risk of aneurysm rupture is less than 1 %/year, he does not smoke, blood pressure is well controlled, and has no family history of aneurysms.  We will continue to monitor aneurysm with yearly MRA brain.  -I have also recommended that he stay hydrated and try to drink more water than Gatorade to avoid elevations in his blood sugar  -I have also ordered a CTA of his head to evaluate for any aneurysm growth in 1 year  -This was a telephone visit approximately 3 PM.  The duration was approximately 15 minutes  Jean was seen today for anterior communicating aneurysm.    Diagnoses and all orders for this visit:    Cerebral aneurysm, nonruptured    Symptomatic stenosis of both carotid arteries without infarction      Return in about 1 year (around 8/24/2021).

## 2021-06-02 ENCOUNTER — TELEPHONE (OUTPATIENT)
Dept: NEUROSURGERY | Facility: CLINIC | Age: 76
End: 2021-06-02

## 2021-06-02 DIAGNOSIS — I65.22 STENOSIS OF LEFT CAROTID ARTERY: Primary | ICD-10-CM

## 2021-06-02 DIAGNOSIS — Z86.79 HX OF CAROTID STENOSIS: ICD-10-CM

## 2021-08-09 ENCOUNTER — APPOINTMENT (OUTPATIENT)
Dept: CT IMAGING | Facility: HOSPITAL | Age: 76
End: 2021-08-09

## 2021-12-11 ENCOUNTER — TELEPHONE (OUTPATIENT)
Dept: URGENT CARE | Facility: CLINIC | Age: 76
End: 2021-12-11

## 2021-12-11 DIAGNOSIS — M25.562 LEFT KNEE PAIN, UNSPECIFIED CHRONICITY: Primary | ICD-10-CM

## 2021-12-11 RX ORDER — NAPROXEN 500 MG/1
500 TABLET ORAL 2 TIMES DAILY WITH MEALS
Qty: 14 TABLET | Refills: 0 | Status: SHIPPED | OUTPATIENT
Start: 2021-12-11 | End: 2021-12-18

## 2021-12-11 NOTE — TELEPHONE ENCOUNTER
Patient requesting his naproxen to be sent to [facility] since [facility] is closed.

## 2023-10-24 ENCOUNTER — APPOINTMENT (OUTPATIENT)
Dept: CT IMAGING | Facility: HOSPITAL | Age: 78
End: 2023-10-24
Payer: MEDICARE

## 2023-10-24 ENCOUNTER — APPOINTMENT (OUTPATIENT)
Dept: GENERAL RADIOLOGY | Facility: HOSPITAL | Age: 78
End: 2023-10-24
Payer: MEDICARE

## 2023-10-24 ENCOUNTER — HOSPITAL ENCOUNTER (EMERGENCY)
Facility: HOSPITAL | Age: 78
Discharge: HOME | End: 2023-10-24
Attending: EMERGENCY MEDICINE | Admitting: EMERGENCY MEDICINE
Payer: MEDICARE

## 2023-10-24 VITALS
HEART RATE: 100 BPM | DIASTOLIC BLOOD PRESSURE: 86 MMHG | RESPIRATION RATE: 18 BRPM | HEIGHT: 68 IN | TEMPERATURE: 97.6 F | WEIGHT: 179 LBS | BODY MASS INDEX: 27.13 KG/M2 | SYSTOLIC BLOOD PRESSURE: 159 MMHG | OXYGEN SATURATION: 93 %

## 2023-10-24 DIAGNOSIS — J18.9 COMMUNITY ACQUIRED PNEUMONIA OF LEFT LOWER LOBE OF LUNG: Primary | ICD-10-CM

## 2023-10-24 LAB
ALBUMIN SERPL-MCNC: 3.2 G/DL (ref 3.5–5.2)
ALBUMIN/GLOB SERPL: 0.8 G/DL
ALP SERPL-CCNC: 144 U/L (ref 39–117)
ALT SERPL W P-5'-P-CCNC: 50 U/L (ref 1–41)
ANION GAP SERPL CALCULATED.3IONS-SCNC: 15.2 MMOL/L (ref 5–15)
AST SERPL-CCNC: 38 U/L (ref 1–40)
BASOPHILS # BLD AUTO: 0.07 10*3/MM3 (ref 0–0.2)
BASOPHILS NFR BLD AUTO: 0.7 % (ref 0–1.5)
BILIRUB SERPL-MCNC: 0.3 MG/DL (ref 0–1.2)
BUN SERPL-MCNC: 11 MG/DL (ref 8–23)
BUN/CREAT SERPL: 10.9 (ref 7–25)
CALCIUM SPEC-SCNC: 8.9 MG/DL (ref 8.6–10.5)
CHLORIDE SERPL-SCNC: 104 MMOL/L (ref 98–107)
CO2 SERPL-SCNC: 17.8 MMOL/L (ref 22–29)
CREAT SERPL-MCNC: 1.01 MG/DL (ref 0.76–1.27)
D DIMER PPP FEU-MCNC: 2.33 MCGFEU/ML (ref 0–0.78)
DEPRECATED RDW RBC AUTO: 50.2 FL (ref 37–54)
EGFRCR SERPLBLD CKD-EPI 2021: 76.1 ML/MIN/1.73
EOSINOPHIL # BLD AUTO: 0.19 10*3/MM3 (ref 0–0.4)
EOSINOPHIL NFR BLD AUTO: 1.9 % (ref 0.3–6.2)
ERYTHROCYTE [DISTWIDTH] IN BLOOD BY AUTOMATED COUNT: 15.1 % (ref 12.3–15.4)
GLOBULIN UR ELPH-MCNC: 4.1 GM/DL
GLUCOSE SERPL-MCNC: 134 MG/DL (ref 65–99)
HCT VFR BLD AUTO: 39.2 % (ref 37.5–51)
HGB BLD-MCNC: 12.7 G/DL (ref 13–17.7)
IMM GRANULOCYTES # BLD AUTO: 0.23 10*3/MM3 (ref 0–0.05)
IMM GRANULOCYTES NFR BLD AUTO: 2.2 % (ref 0–0.5)
LYMPHOCYTES # BLD AUTO: 0.76 10*3/MM3 (ref 0.7–3.1)
LYMPHOCYTES NFR BLD AUTO: 7.4 % (ref 19.6–45.3)
MCH RBC QN AUTO: 29.3 PG (ref 26.6–33)
MCHC RBC AUTO-ENTMCNC: 32.4 G/DL (ref 31.5–35.7)
MCV RBC AUTO: 90.3 FL (ref 79–97)
MONOCYTES # BLD AUTO: 0.7 10*3/MM3 (ref 0.1–0.9)
MONOCYTES NFR BLD AUTO: 6.8 % (ref 5–12)
NEUTROPHILS NFR BLD AUTO: 8.31 10*3/MM3 (ref 1.7–7)
NEUTROPHILS NFR BLD AUTO: 81 % (ref 42.7–76)
NRBC BLD AUTO-RTO: 0 /100 WBC (ref 0–0.2)
NT-PROBNP SERPL-MCNC: 333.8 PG/ML (ref 0–1800)
PLATELET # BLD AUTO: 722 10*3/MM3 (ref 140–450)
PMV BLD AUTO: 9.6 FL (ref 6–12)
POTASSIUM SERPL-SCNC: 4.4 MMOL/L (ref 3.5–5.2)
PROCALCITONIN SERPL-MCNC: 0.1 NG/ML (ref 0–0.25)
PROT SERPL-MCNC: 7.3 G/DL (ref 6–8.5)
QT INTERVAL: 388 MS
QTC INTERVAL: 471 MS
RBC # BLD AUTO: 4.34 10*6/MM3 (ref 4.14–5.8)
SODIUM SERPL-SCNC: 137 MMOL/L (ref 136–145)
TROPONIN T SERPL HS-MCNC: 15 NG/L
TROPONIN T SERPL HS-MCNC: 17 NG/L
WBC NRBC COR # BLD: 10.26 10*3/MM3 (ref 3.4–10.8)

## 2023-10-24 PROCEDURE — 93010 ELECTROCARDIOGRAM REPORT: CPT | Performed by: INTERNAL MEDICINE

## 2023-10-24 PROCEDURE — 83880 ASSAY OF NATRIURETIC PEPTIDE: CPT | Performed by: EMERGENCY MEDICINE

## 2023-10-24 PROCEDURE — 85379 FIBRIN DEGRADATION QUANT: CPT | Performed by: EMERGENCY MEDICINE

## 2023-10-24 PROCEDURE — 84145 PROCALCITONIN (PCT): CPT | Performed by: EMERGENCY MEDICINE

## 2023-10-24 PROCEDURE — 80053 COMPREHEN METABOLIC PANEL: CPT | Performed by: EMERGENCY MEDICINE

## 2023-10-24 PROCEDURE — 93005 ELECTROCARDIOGRAM TRACING: CPT | Performed by: EMERGENCY MEDICINE

## 2023-10-24 PROCEDURE — 36415 COLL VENOUS BLD VENIPUNCTURE: CPT

## 2023-10-24 PROCEDURE — 25510000001 IOPAMIDOL PER 1 ML: Performed by: EMERGENCY MEDICINE

## 2023-10-24 PROCEDURE — 99285 EMERGENCY DEPT VISIT HI MDM: CPT

## 2023-10-24 PROCEDURE — 84484 ASSAY OF TROPONIN QUANT: CPT | Performed by: EMERGENCY MEDICINE

## 2023-10-24 PROCEDURE — 71046 X-RAY EXAM CHEST 2 VIEWS: CPT

## 2023-10-24 PROCEDURE — 85025 COMPLETE CBC W/AUTO DIFF WBC: CPT | Performed by: EMERGENCY MEDICINE

## 2023-10-24 PROCEDURE — 71275 CT ANGIOGRAPHY CHEST: CPT

## 2023-10-24 RX ADMIN — IOPAMIDOL 100 ML: 755 INJECTION, SOLUTION INTRAVENOUS at 13:08

## 2023-10-24 NOTE — DISCHARGE INSTRUCTIONS
Follow-up with your primary care provider within 1 week.  Return to the emergency department if there is worsening shortness of breath, worse in any way at all

## 2023-10-24 NOTE — ED PROVIDER NOTES
"Subjective   History of Present Illness  History of Present Illness    Chief complaint: Fatigue and cough    Location: Home    Quality/Severity: Mild    Timing/Onset/Duration: Present for about a week    Modifying Factors: Worse with exertion    Associated Symptoms: No headache.  No fever or chills.  Patient's had a cough.  No sore throat earache or nasal congestion.  No chest pain.  No shortness of breath.  No abdominal pain.  No diarrhea or burning when he urinates.  No nausea or vomiting.    Narrative: This 78-year-old white male with a history diabetes, COPD, hypertension, coronary artery disease, and aneurysm, who is a former smoker, and does not drink alcohol or use illicit drugs, presents fatigue with fatigue and and earache for about a week.  The patient's primary care provider diagnosed patient with pneumonia and prescribed him Rocephin and Levaquin but patient states he still is not feeling any better.  Patient states that last Sunday he was pinned between a bowl and a 4 x 4 while trying to put the ball back in the pen.  Patient denies trauma from this event.  The patient states that he was sent because the primary care provider heard a \"rattle in his chest\".    PCP:Feng Trinh MD      Review of Systems   Constitutional:  Negative for chills and fever.   HENT:  Negative for congestion and sore throat.    Respiratory:  Negative for cough and shortness of breath.    Cardiovascular:  Negative for chest pain.   Gastrointestinal:  Negative for abdominal pain, diarrhea, nausea and vomiting.   Genitourinary:  Negative for dysuria.   Musculoskeletal:  Negative for back pain.   Skin:  Negative for rash.   Neurological:  Negative for headaches.         Past Medical History:   Diagnosis Date    Aneurysm     Arthritis     Bronchitis     CAD (coronary artery disease), hx stent 9/26/2017    Carpal tunnel syndrome     COPD (chronic obstructive pulmonary disease)     Diabetes mellitus     Hypertension        No Known " Allergies    Past Surgical History:   Procedure Laterality Date    APPENDECTOMY      BACK SURGERY      x4    CARDIAC SURGERY      CORONARY ANGIOPLASTY WITH STENT PLACEMENT      EYE SURGERY      cataracts    HERNIA REPAIR      [place] TEAEC W/PATCH GRF CAROTID VERTB SUBCLAV NECK INC Left 2017    Procedure: CAROTID ENDARTERECTOMY;  Surgeon: Salvador Morales MD;  Location: [facility];  Service: Neurosurgery       Family History   Problem Relation Age of Onset    Dementia Mother     Heart disease Father     Cancer Father     Heart disease Brother     Cancer Sister        Social History     Socioeconomic History    Marital status:    Tobacco Use    Smoking status: Former     Packs/day: 3.00     Years: 35.00     Additional pack years: 0.00     Total pack years: 105.00     Types: Cigarettes     Quit date:      Years since quittin.8    Smokeless tobacco: Never   Vaping Use    Vaping Use: Never used   Substance and Sexual Activity    Alcohol use: No    Drug use: No    Sexual activity: Defer           Objective   Physical Exam  Vitals reviewed: The temperature is 97.6 °F, pulse 96, respirations 22, /84, room air pulse ox 95%..   Constitutional:       Appearance: Normal appearance.   HENT:      Head: Normocephalic and atraumatic.      Right Ear: Tympanic membrane normal.      Left Ear: Tympanic membrane normal.   Cardiovascular:      Rate and Rhythm: Normal rate and regular rhythm.      Pulses: Normal pulses.      Heart sounds: Normal heart sounds. No murmur heard.     No friction rub. No gallop.   Pulmonary:      Effort: Pulmonary effort is normal.      Breath sounds: Rhonchi (Left base) present.   Abdominal:      General: Abdomen is flat. Bowel sounds are normal. There is no distension.      Palpations: Abdomen is soft. There is no mass.      Tenderness: There is no abdominal tenderness. There is no guarding or rebound.      Hernia: No hernia is present.   Musculoskeletal:         General: No  swelling or tenderness. Normal range of motion.      Cervical back: Normal range of motion and neck supple.   Skin:     General: Skin is warm.      Findings: No rash.   Neurological:      General: No focal deficit present.      Mental Status: He is alert and oriented to person, place, and time.       Procedures           ED Course  ED Course as of 10/24/23 1341   Tue Oct 24, 2023   1121 Troponin is 17 and elevated. [RC]   1221 The proBNP is 333 and normal. [RC]   1221 The hemoglobin is 12.7.  The platelet count 722,000, the neutrophil percent is 81%.  The absolute neutrophil count is 8.3.  The CBC is otherwise unremarkable. [RC]   1221 The D-dimer is 2.33 and elevated. [RC]   1222 The procalcitonin is 0.10 and normal. [RC]   1222 The glucose is 134, CO2 17, albumin 3.2, ALT 50, alk phos 144, GFR is 76.  The CMP is otherwise unremarkable. [RC]   1339 High-sensitivity troponin is 15 with a delta T of -2. [RC]      ED Course User Index  [RC] Alfredo Petit MD      10:57 EDT, 10/24/23: EKG was obtained at 1049 and read by me at 1051.  EKG shows a normal sinus rhythm with rate 88.  There is borderline left axis deviation with no hypertrophy.  The WA, QRS, QT intervals are unremarkable.  There is no ectopy.  There is no acute ST elevation or depression.       13:42 EDT, 10/24/23:  The patient was reassessed.  He has no new complaints.  His vital signs reviewed and are stable.  The patient's diagnosis of resolving pneumonia was discussed with him.  Patient's vital signs are blood pressure 159/86, pulse 100, respirations 24, room air pulse ox 92%.  Lung exam: Rhonchi noted in the left lung as before unchanged.    13:43 EDT, 10/24/23:  The patient's diagnosis of resolving pneumonia was discussed with him.  The patient should follow-up with his primary care provider within 1 week.  He should return to the emergency department if there is worsening shortness of breath, worse in any way at all.  All patient questions were  answered he will be discharged in good condition.                                Medical Decision Making      Final diagnoses:   Community acquired pneumonia of left lower lobe of lung       ED Disposition  ED Disposition       None            No follow-up provider specified.       Medication List      No changes were made to your prescriptions during this visit.            Alfredo Petit MD  10/24/23 9710

## 2023-10-24 NOTE — ED TRIAGE NOTES
Pt via PV from home with c/o generalized fatigue, earache  x about 1 week. PCP dx with PNA and prescribed him rocephin and levaquin but he still isn't feeling any better.   Pt also reports last Sunday that he got pinned btw bull and 4x4 while trying to put bull back in pin; no obvious bruising or deformity noted.

## 2025-03-20 ENCOUNTER — TRANSCRIBE ORDERS (OUTPATIENT)
Dept: ULTRASOUND IMAGING | Facility: HOSPITAL | Age: 80
End: 2025-03-20
Payer: MEDICARE

## 2025-03-20 DIAGNOSIS — Z86.79 PERSONAL HISTORY OF OTHER DISEASES OF THE CIRCULATORY SYSTEM: Primary | ICD-10-CM

## 2025-03-20 DIAGNOSIS — H34.8121 CENTRAL RETINAL VEIN OCCLUSION, LEFT EYE, WITH RETINAL NEOVASCULARIZATION: ICD-10-CM

## 2025-04-07 ENCOUNTER — HOSPITAL ENCOUNTER (OUTPATIENT)
Dept: ULTRASOUND IMAGING | Facility: HOSPITAL | Age: 80
Discharge: HOME | End: 2025-04-07
Admitting: REGISTERED NURSE
Payer: MEDICARE

## 2025-04-07 DIAGNOSIS — Z86.79 PERSONAL HISTORY OF OTHER DISEASES OF THE CIRCULATORY SYSTEM: ICD-10-CM

## 2025-04-07 DIAGNOSIS — H34.8121 CENTRAL RETINAL VEIN OCCLUSION, LEFT EYE, WITH RETINAL NEOVASCULARIZATION: ICD-10-CM

## 2025-04-07 PROCEDURE — 93880 EXTRACRANIAL BILAT STUDY: CPT

## (undated) DEVICE — CATHETER,URETHRAL,REDRUBBER,STERILE,22FR: Brand: MEDLINE

## (undated) DEVICE — Device

## (undated) DEVICE — 1 ML TUBERCULIN SYRINGE REGULAR TIP: Brand: MONOJECT

## (undated) DEVICE — DRP SLUSH WARMR MACH 52X66IN OM-ORS-301

## (undated) DEVICE — SUT SILK 4/0 TIES 18IN A183H

## (undated) DEVICE — NDL HYPO PRECISIONGLIDE REG 25G 1 1/2

## (undated) DEVICE — ANTIBACTERIAL UNDYED BRAIDED (POLYGLACTIN 910), SYNTHETIC ABSORBABLE SUTURE: Brand: COATED VICRYL

## (undated) DEVICE — ENCORE® LATEX ORTHO SIZE 8.5, STERILE LATEX POWDER-FREE SURGICAL GLOVE: Brand: ENCORE

## (undated) DEVICE — GOWN,PREVENTION PLUS,XXLARGE,STERILE: Brand: MEDLINE

## (undated) DEVICE — CONN TBG Y 5 IN 1 LF STRL

## (undated) DEVICE — SUT MNCRYL PLS ANTIB UD 4/0 PS2 18IN

## (undated) DEVICE — SUT PROLN 6/0 BV1 D/A 30IN 8709H

## (undated) DEVICE — 3M™ IOBAN™ 2 ANTIMICROBIAL INCISE DRAPE 6640EZ: Brand: IOBAN™ 2

## (undated) DEVICE — NDL HYPO PRECISIONGLIDE/REG 30G 1/2 BRN

## (undated) DEVICE — SOL NS 500ML

## (undated) DEVICE — ELECTRD BLD EDGE/INSUL1P 2.4X5.1MM STRL

## (undated) DEVICE — GLV SURG SENSICARE MICRO PF LF 8 STRL

## (undated) DEVICE — SMOKE EVACUATION TUBING WITH 7/8 IN TO 1/4 IN REDUCER: Brand: BUFFALO FILTER

## (undated) DEVICE — DISPOSABLE IRRIGATION BIPOLAR CORD, M1000 TYPE: Brand: KIRWAN

## (undated) DEVICE — 3M™ STERI-DRAPE™ INSTRUMENT POUCH 1018: Brand: STERI-DRAPE™

## (undated) DEVICE — SUT SILK 2/0 SH CR5 18IN C0125

## (undated) DEVICE — SHEET, DRAPE, SPLIT, STERILE: Brand: MEDLINE

## (undated) DEVICE — GLV SURG BIOGEL LTX PF 7 1/2

## (undated) DEVICE — CONTAINER,SPECIMEN,OR STERILE,4OZ: Brand: MEDLINE

## (undated) DEVICE — TOWEL,OR,DSP,ST,BLUE,STD,4/PK,20PK/CS: Brand: MEDLINE

## (undated) DEVICE — CODMAN® SURGICAL PATTIES 1" X 3" (2.54CM X 7.62CM): Brand: CODMAN®

## (undated) DEVICE — CVR PROB 96IN LF STRL

## (undated) DEVICE — SUT SILK 2/0 TIES 18IN A185H

## (undated) DEVICE — ADHS SKIN DERMABOND TOP ADVANCED

## (undated) DEVICE — TP UMB COTN RO 1/8X24IN U16G